# Patient Record
Sex: FEMALE | Race: WHITE | URBAN - METROPOLITAN AREA
[De-identification: names, ages, dates, MRNs, and addresses within clinical notes are randomized per-mention and may not be internally consistent; named-entity substitution may affect disease eponyms.]

---

## 2017-03-06 ENCOUNTER — HOSPITAL ENCOUNTER (OUTPATIENT)
Dept: SURGERY | Age: 62
Discharge: HOME | End: 2017-03-06
Admitting: INTERNAL MEDICINE

## 2017-03-06 VITALS
HEIGHT: 60 IN | TEMPERATURE: 98 F | OXYGEN SATURATION: 94 % | BODY MASS INDEX: 32.2 KG/M2 | HEART RATE: 72 BPM | WEIGHT: 164 LBS | RESPIRATION RATE: 12 BRPM | DIASTOLIC BLOOD PRESSURE: 65 MMHG | SYSTOLIC BLOOD PRESSURE: 112 MMHG

## 2017-03-06 LAB
GLUCOSE BLD-MCNC: 115 MG/DL (ref 70–99)
PERFORMED ON: ABNORMAL

## 2017-03-06 RX ORDER — LIDOCAINE HYDROCHLORIDE 10 MG/ML
0.1 INJECTION, SOLUTION EPIDURAL; INFILTRATION; INTRACAUDAL; PERINEURAL ONCE
Status: DISCONTINUED | OUTPATIENT
Start: 2017-03-06 | End: 2017-03-07 | Stop reason: HOSPADM

## 2017-03-06 RX ORDER — SODIUM CHLORIDE, SODIUM LACTATE, POTASSIUM CHLORIDE, CALCIUM CHLORIDE 600; 310; 30; 20 MG/100ML; MG/100ML; MG/100ML; MG/100ML
1000 INJECTION, SOLUTION INTRAVENOUS ONCE
Status: DISCONTINUED | OUTPATIENT
Start: 2017-03-06 | End: 2017-03-07 | Stop reason: HOSPADM

## 2017-03-06 RX ORDER — LIDOCAINE HYDROCHLORIDE 10 MG/ML
1 INJECTION, SOLUTION EPIDURAL; INFILTRATION; INTRACAUDAL; PERINEURAL ONCE
Status: DISCONTINUED | OUTPATIENT
Start: 2017-03-06 | End: 2017-03-07 | Stop reason: HOSPADM

## 2017-03-06 RX ORDER — SODIUM CHLORIDE, SODIUM LACTATE, POTASSIUM CHLORIDE, CALCIUM CHLORIDE 600; 310; 30; 20 MG/100ML; MG/100ML; MG/100ML; MG/100ML
INJECTION, SOLUTION INTRAVENOUS CONTINUOUS
Status: DISCONTINUED | OUTPATIENT
Start: 2017-03-06 | End: 2017-03-07 | Stop reason: HOSPADM

## 2017-03-06 RX ORDER — ATORVASTATIN CALCIUM 80 MG/1
80 TABLET, FILM COATED ORAL DAILY
COMMUNITY

## 2017-03-06 RX ADMIN — SODIUM CHLORIDE, SODIUM LACTATE, POTASSIUM CHLORIDE, CALCIUM CHLORIDE: 600; 310; 30; 20 INJECTION, SOLUTION INTRAVENOUS at 09:41

## 2017-03-06 ASSESSMENT — PAIN SCALES - GENERAL
PAINLEVEL_OUTOF10: 0

## 2017-03-06 ASSESSMENT — PAIN - FUNCTIONAL ASSESSMENT: PAIN_FUNCTIONAL_ASSESSMENT: 0-10

## 2023-01-09 PROBLEM — M79.7 FIBROMYALGIA: Status: ACTIVE | Noted: 2023-01-09

## 2023-01-09 PROBLEM — K21.9 GASTROESOPHAGEAL REFLUX DISEASE WITHOUT ESOPHAGITIS: Status: ACTIVE | Noted: 2023-01-09

## 2023-01-09 PROBLEM — G56.01 CARPAL TUNNEL SYNDROME OF RIGHT WRIST: Status: ACTIVE | Noted: 2023-01-09

## 2023-01-09 PROBLEM — R73.03 PREDIABETES: Status: ACTIVE | Noted: 2023-01-09

## 2023-01-21 NOTE — PROGRESS NOTES
[address] and [facility]                                             [address] [facility], [address] [place], [place] [address], [address] [place] [address]        Phone: [phone]                                     Name:  Azeem Drummond  :    1955      Consultants:   Patient Care Team:  Ani Constantino PA-C as PCP - General    Chief Complaint:     Azeem Drummond is a 74 y.o. female  who presents today for a New Patient care visit with Personalized Prevention Plan Services as noted below. HPI:     Azeem Drummond is a 72yo female with PMH of TERENCE, HLD, GERD, migraines, fibromyalgia, b/l plantar fasciitis, prediabetes and carpal tunnel (R) who presents today as a new patient to establish care. Her previous PCP was Mau Zarate. Patient is poor historian and has not been to a PCP since 2019. Prediabetes  - previously on Metformin   - has not checked a1c in several years  - she describes a family history of both T1DM and T2DM in her siblings. Obesity  - BMI 34.79  - states she recently started going to the gym and working out and has been trying to eat healthier, noting that she decreased her soda consumption 1/day. Health Maintenance  - Colonoscopy 4 years ago. Will need to get records from Dr. Sumeet Eisenberg. States it was normal at that time. - apparent lumpectomy, sentinel node biopsy 20+ years ago, and has not had mammogram since that time. Declines vaccines today because she wants to check her vaccine record at home. 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 Breast cancer screening  - apparent lumpectomy, sentinel node biopsy 20+ years ago.  States all came back benign.  - has not had mammogram since that time  Screening for osteoporosis/osteopenia  - s/p hysterectomy sometime around 2017.  - DEXA scan never done            Patient Active Problem List   Diagnosis    TERENCE (generalized anxiety disorder)    Other hyperlipidemia    Plantar fasciitis, bilateral    Migraine with aura and without status migrainosus, not intractable    Prediabetes    Fibromyalgia    Carpal tunnel syndrome of right wrist    Gastroesophageal reflux disease without esophagitis         Past Medical History:    Past Medical History:   Diagnosis Date    Arthritis     Depression     Hyperlipidemia     Thyroid disease        Past Surgical History:  Past Surgical History:   Procedure Laterality Date    BREAST LUMPECTOMY      CHOLECYSTECTOMY      COLONOSCOPY  03/06/2017    polyps    HYSTERECTOMY         Home Meds:  Prior to Visit Medications    Medication Sig Taking? Authorizing Provider   naproxen (NAPROSYN) 500 MG tablet Take 1 tablet by mouth 2 times daily (with meals)  Aiyana Ching, APRN - CNP   atorvastatin (LIPITOR) 80 MG tablet Take 80 mg by mouth daily  Historical Provider, MD   metFORMIN (GLUCOPHAGE) 500 MG tablet Take 500 mg by mouth daily  Historical Provider, MD   Cholecalciferol (VITAMIN D-3 PO) Take 2,000 Units by mouth  Historical Provider, MD   aspirin 81 MG tablet Take 81 mg by mouth daily.  Historical Provider, MD       Allergies:    Patient has no active allergies.    Family History:       Problem Relation Age of Onset    Lung Cancer Mother     Diabetes Father     Other Sister         possible colon cancer, pt unsure       Social History:  Social History       Tobacco History       Smoking Status  Former Quit Date  3/12/2017 Smoking Frequency  0.25 packs/day Smoking Tobacco Type  Cigarettes quit in 3/12/2017      Smokeless Tobacco Use  Unknown              Alcohol History       Alcohol Use Status  No              Drug Use       Drug Use Status  No              Sexual Activity       Sexually Active  Not Asked                       Health Maintenance Completed:  Health Maintenance   Topic Date Due    COVID-19 Vaccine (1) Never done    Lipids  Never done    Depression Screen  Never done    HIV screen  Never done    Hepatitis C screen   Never done    Colorectal Cancer Screen  Never done    Breast cancer screen  Never done    Shingles vaccine (1 of 2) Never done    DEXA (modify frequency per FRAX score)  Never done    A1C test (Diabetic or Prediabetic)  12/16/2017    DTaP/Tdap/Td vaccine (2 - Td or Tdap) 06/22/2021    Pneumococcal 65+ years Vaccine (1 - PCV) Never done    Flu vaccine (1) 08/01/2022    Hepatitis A vaccine  Aged Out    Hib vaccine  Aged Out    Meningococcal (ACWY) vaccine  Aged Out          Immunization History   Administered Date(s) Administered    Influenza Virus Vaccine 11/13/2018    Tdap (Boostrix, Adacel) 06/22/2011         Review of Systems:  Review of Systems   Constitutional:  Negative for chills and fever. Respiratory:  Negative for shortness of breath. Cardiovascular:  Negative for chest pain and palpitations. Gastrointestinal:  Negative for constipation, diarrhea, nausea and vomiting. Neurological:  Negative for dizziness and light-headedness. Psychiatric/Behavioral:  Negative for dysphoric mood, self-injury and suicidal ideas. The patient is not nervous/anxious. Physical Exam:   Vitals:    01/24/23 1240   BP: 126/70   Pulse: 60   Temp: 97.4 °F (36.3 °C)   SpO2: 98%   Weight: 190 lb 3.2 oz (86.3 kg)   Height: 5' 2\" (1.575 m)     Body mass index is 34.79 kg/m². Wt Readings from Last 3 Encounters:   04/26/17 173 lb (78.5 kg)   03/06/17 164 lb (74.4 kg)   09/14/16 177 lb 14.6 oz (80.7 kg)       BP Readings from Last 3 Encounters:   04/26/17 (!) 141/46   03/06/17 112/65   09/14/16 (!) 180/93       Physical Exam  Constitutional:       General: She is not in acute distress. HENT:      Head: Normocephalic and atraumatic. Cardiovascular:      Rate and Rhythm: Normal rate and regular rhythm. Heart sounds: Normal heart sounds. No murmur heard. No friction rub. No gallop. Pulmonary:      Effort: Pulmonary effort is normal. No respiratory distress. Breath sounds: Normal breath sounds.  No wheezing, rhonchi or rales. Abdominal:      General: Bowel sounds are normal. There is no distension. Tenderness: There is no abdominal tenderness. There is no guarding. Musculoskeletal:      Right lower leg: No edema. Left lower leg: No edema. Neurological:      General: No focal deficit present. Mental Status: She is alert. Psychiatric:         Mood and Affect: Mood normal.         Thought Content: Thought content normal.            Lab Review:   No visits with results within 6 Month(s) from this visit. Latest known visit with results is:   Hospital Outpatient Visit on 03/06/2017   Component Date Value    POC Glucose 03/06/2017 115 (A)     Performed on 03/06/2017 ACCU-CHEK           Assessment/Plan:  Guadalupe Eisenmenger is a 74yo female with PMH of TERENCE, HLD, GERD, migraines, fibromyalgia, b/l plantar fasciitis, prediabetes and carpal tunnel (R) who presents today as a new patient to establish care. Diagnoses and all orders for this visit:    Encounter to establish care with new doctor  -     CBC with Auto Differential; Future  -     Comprehensive Metabolic Panel; Future  -     LIPID PANEL; Future  -     Hemoglobin A1C; Future  - follow up in 4 weeks    Obesity (BMI 30-39.9)  - not at goal  - Discussed at length Lifestyle modifications to include decreased consumption of sugary foods and refined carbohydrates (pastas, breads, etc), increased consumption of lean proteins, increased consumption of green leafy vegetables, and at least 150min of moderate intensity exercise each week. -     CBC with Auto Differential; Future  -     Comprehensive Metabolic Panel; Future  -     LIPID PANEL; Future  -     Hemoglobin A1C; Future  - follow up in 4 weeks    Breast cancer screening by mammogram  - unknown status  - apparent lumpectomy and sentinel node biopsy greater than 20 years ago per patient.  States all came back benign   - Overdue for screening per USPSTF guidelines for biennial screening in women 55-74 years  -     JARAD DIGITAL SCREEN W OR WO CAD BILATERAL; Future  - follow up in 4 weeks      Other specified diabetes mellitus without complications ([facility] [address]   History of prediabetes  - unknown status   - Hemoglobin A1C; Future  - previously on Metformin  - last a1c unknown  - follow up in 4 weeks    Age-related osteoporosis without current pathological fracture   Encounter for screening for osteoporosis  Post-menopause  - status unknown  - Overdue for screening per USPSTF guidelines for women 65+  - DEXA BONE DENSITY AXIAL SKELETON; Future  - DEXA never done. - follow up in 4 weeks    Healthcare maintenance  -     LIPID PANEL; Future  -     Hemoglobin A1C; Future  -     JARAD DIGITAL SCREEN W OR WO CAD BILATERAL; Future  -     DEXA BONE DENSITY AXIAL SKELETON; Future  - declines HIV and HepC screening at this time and states she \"has not been with someone in over 16 years\" and \"if I had it, I would know. 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 - follow up in 4 weeks      Health Maintenance Due:  Health Maintenance Due   Topic Date Due    COVID-19 Vaccine (1) Never done    Lipids  Never done    Depression Screen  Never done    HIV screen  Never done    Hepatitis C screen  Never done    Colorectal Cancer Screen  Never done    Breast cancer screen  Never done    Shingles vaccine (1 of 2) Never done    DEXA (modify frequency per FRAX score)  Never done    A1C test (Diabetic or Prediabetic)  12/16/2017    DTaP/Tdap/Td vaccine (2 - Td or Tdap) 06/22/2021    Pneumococcal 65+ years Vaccine (1 - PCV) Never done    Flu vaccine (1) 08/01/2022        Health care decision maker:   Discussed today and patient named and filled out a form designating her daughter.       Health Maintenance: (USPSTF Recommendations)  (F) Breast Cancer Screen: (40-49 (C), 50-74 biennial screening mammogram (B))  (F) Cervical Cancer Screen: (21-29 q3yr cytology alone; 30-65 q3yr cytology alone, q5yr with hrHPV alone, or q5yr cytology+hrHPV (A))  (M) Prostate Cancer Screen: (54-79 yo discuss benefits/harm, does not recommend testing PSA in men >73 yo (D):   (M) AAA Screen: (men 73-69 yo who has ever smoked (B), consider in nonsmokers if high risk):  CRC/Colonoscopy Screening: (adults 39-53 (B), 50-75 (A))  Lung Ca Screening: Annual LDCT (+smoker age 49-80, smoked within 15 years, total of 20 pack yr history (B)):  DEXA Screen: (women >65 and older, <65 if at risk/postmenopausal (B))  HIV Screen: (16-65 yr old, and all pregnant patients (A)): Hep C Screen: (18-79 yr old (B)):  HCC Screen: (all pts with cirrhosis and high risk Hep B (US q6 mo)):  Immunizations:    RTC:  No follow-ups on file. EMR Dragon/transcription disclaimer:  Much of this encounter note is electronic transcription/translation of spoken language to printed texts. The electronic translation of spoken language may be erroneous, or at times, nonsensical words or phrases may be inadvertently transcribed.   Although I have reviewed the note for such errors, some may still exist.

## 2023-01-24 ENCOUNTER — OFFICE VISIT (OUTPATIENT)
Dept: PRIMARY CARE CLINIC | Age: 68
End: 2023-01-24
Payer: COMMERCIAL

## 2023-01-24 VITALS
SYSTOLIC BLOOD PRESSURE: 126 MMHG | BODY MASS INDEX: 35 KG/M2 | HEART RATE: 60 BPM | TEMPERATURE: 97.4 F | DIASTOLIC BLOOD PRESSURE: 70 MMHG | OXYGEN SATURATION: 98 % | WEIGHT: 190.2 LBS | HEIGHT: 62 IN

## 2023-01-24 DIAGNOSIS — E13.9 OTHER SPECIFIED DIABETES MELLITUS WITHOUT COMPLICATIONS (HCC): ICD-10-CM

## 2023-01-24 DIAGNOSIS — Z12.31 BREAST CANCER SCREENING BY MAMMOGRAM: ICD-10-CM

## 2023-01-24 DIAGNOSIS — E66.9 OBESITY (BMI 30-39.9): ICD-10-CM

## 2023-01-24 DIAGNOSIS — Z87.898 HISTORY OF PREDIABETES: ICD-10-CM

## 2023-01-24 DIAGNOSIS — Z78.0 POST-MENOPAUSE: ICD-10-CM

## 2023-01-24 DIAGNOSIS — M81.0 AGE-RELATED OSTEOPOROSIS WITHOUT CURRENT PATHOLOGICAL FRACTURE: ICD-10-CM

## 2023-01-24 DIAGNOSIS — Z13.820 ENCOUNTER FOR SCREENING FOR OSTEOPOROSIS: ICD-10-CM

## 2023-01-24 DIAGNOSIS — Z76.89 ENCOUNTER TO ESTABLISH CARE WITH NEW DOCTOR: Primary | ICD-10-CM

## 2023-01-24 DIAGNOSIS — Z00.00 HEALTHCARE MAINTENANCE: ICD-10-CM

## 2023-01-24 PROCEDURE — 99204 OFFICE O/P NEW MOD 45 MIN: CPT

## 2023-01-24 PROCEDURE — 1123F ACP DISCUSS/DSCN MKR DOCD: CPT

## 2023-01-24 RX ORDER — ACETAMINOPHEN 500 MG
500 TABLET ORAL EVERY 6 HOURS PRN
COMMUNITY

## 2023-01-24 RX ORDER — COVID-19 ANTIGEN TEST
KIT MISCELLANEOUS
COMMUNITY

## 2023-01-24 RX ORDER — CHLORAL HYDRATE 500 MG
CAPSULE ORAL DAILY
COMMUNITY

## 2023-01-24 SDOH — ECONOMIC STABILITY: FOOD INSECURITY: WITHIN THE PAST 12 MONTHS, YOU WORRIED THAT YOUR FOOD WOULD RUN OUT BEFORE YOU GOT MONEY TO BUY MORE.: NEVER TRUE

## 2023-01-24 SDOH — ECONOMIC STABILITY: FOOD INSECURITY: WITHIN THE PAST 12 MONTHS, THE FOOD YOU BOUGHT JUST DIDN'T LAST AND YOU DIDN'T HAVE MONEY TO GET MORE.: NEVER TRUE

## 2023-01-24 ASSESSMENT — ENCOUNTER SYMPTOMS
COUGH: 0
ABDOMINAL PAIN: 0
DIARRHEA: 0
VOMITING: 0
BLOOD IN STOOL: 0
SHORTNESS OF BREATH: 0
CONSTIPATION: 0
TROUBLE SWALLOWING: 0
NAUSEA: 0

## 2023-01-24 ASSESSMENT — SOCIAL DETERMINANTS OF HEALTH (SDOH): HOW HARD IS IT FOR YOU TO PAY FOR THE VERY BASICS LIKE FOOD, HOUSING, MEDICAL CARE, AND HEATING?: NOT HARD AT ALL

## 2023-01-24 NOTE — PROGRESS NOTES
[address],  [place], [address] [facility] [address]        Phone: [phone]    The following is written by a medical student, please see below for resident/attending attestation and plan. Name:  Anton Goodrich  :    1955    Consultants:   Patient Care Team:  Haris Mock DO as PCP - General (Family Medicine)    Chief Complaint:     Anton Goodrich is a 74 y.o. female with history of self reported prediabetes and obesity who presents today for an established patient care visit with Personalized Prevention Plan Services as noted below. HPI:       She is here to establish care because she has not seen a provider in many years due to being uninsured. She stopped taking all prescriptions from her former doctor. She cannot recall all the medications besides metformin. Obesity  She reports trying to make healthier choices. She is down to 1 soda per day. She goes to the gym a few times per week and walks frequently at work. Health maintenance:  Last pap smear was unknown date and was normal.  Last mammogram was >2 years ago and was normal. History of lumpectomies bilaterally, she says they were benign. DEXA never done. Last colonoscopy was 3-4 years ago and they removed some polyps. Declines vaccines today because she wants to check her vaccine record at home. 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       Patient Active Problem List   Diagnosis    TERENCE (generalized anxiety disorder)    Other hyperlipidemia    Plantar fasciitis, bilateral    Migraine with aura and without status migrainosus, not intractable    Prediabetes    Fibromyalgia    Carpal tunnel syndrome of right wrist    Gastroesophageal reflux disease without esophagitis       Past Medical History:    Past Medical History:   Diagnosis Date    Arthritis     Depression     Hyperlipidemia     Thyroid disease        Past Surgical History:  Past Surgical History:   Procedure Laterality Date    BREAST LUMPECTOMY      CHOLECYSTECTOMY      COLONOSCOPY  03/06/2017    polyps    HYSTERECTOMY (CERVIX STATUS UNKNOWN)         Home Meds:  Prior to Visit Medications    Medication Sig Taking? Authorizing Provider   Omega-3 Fatty Acids (FISH OIL) 1000 MG capsule Take by mouth daily Yes Historical Provider, MD   Multiple Vitamins-Minerals (ONE-A-DAY WOMENS VITACRAVES PO) Take by mouth Yes Historical Provider, MD   acetaminophen (TYLENOL) 500 MG tablet Take 500 mg by mouth every 6 hours as needed for Pain Yes Historical Provider, MD   Naproxen Sodium (ALEVE) 220 MG CAPS Take by mouth Yes Historical Provider, MD   aspirin 81 MG tablet Take 81 mg by mouth daily. Yes Historical Provider, MD       Allergies:    Patient has no known allergies.    Family History:       Problem Relation Age of Onset    Lung Cancer Mother     Diabetes Father     Other Sister         possible colon cancer, pt unsure       Social History  Tobacco use--Former smoker, quit 21 years ago. Started age 23. Off and on for a few months, smoking <1 pack per week.  Alcohol use--0 drinks per week.  Drug use--Never.  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    Health Maintenance Completed:  Health Maintenance   Topic Date Due    Depression Screen  Never done    HIV screen  Never done    Hepatitis C screen  Never done    Lipids  Never done    Colorectal Cancer Screen  Never done    Breast cancer screen  Never done    Shingles vaccine (1 of 2) Never done    DEXA (modify frequency per FRAX score)  Never done    A1C test (Diabetic or Prediabetic)  11/16/2019    DTaP/Tdap/Td vaccine (2 - Td or Tdap) 06/22/2021    COVID-19 Vaccine (4 - Booster for Pfizer series) 12/21/2021    Pneumococcal 65+ years Vaccine (1 - PCV) Never done    Flu vaccine (1) 08/01/2022    Hepatitis A vaccine  Aged Out    Hib vaccine   Aged Out    Meningococcal (ACWY) vaccine  Aged Out          Immunization History   Administered Date(s) Administered    COVID-19, PFIZER PURPLE top, DILUTE for use, (age 15 y+), 30mcg/0.3mL 12/21/2020, 01/11/2021, 10/26/2021    Influenza Virus Vaccine 11/13/2018    Tdap (Boostrix, Adacel) 06/22/2011         Review of Systems:  Review of Systems   Constitutional:  Negative for chills and fever. HENT:  Negative for hearing loss and trouble swallowing. Respiratory:  Negative for cough and shortness of breath. Cardiovascular:  Negative for chest pain, palpitations and leg swelling. Gastrointestinal:  Negative for abdominal pain, blood in stool and diarrhea. Genitourinary:  Negative for dysuria and hematuria. Musculoskeletal:  Negative for arthralgias and myalgias. Skin: Negative. Neurological:  Negative for weakness and light-headedness. Hematological: Negative. Physical Exam:   Vitals:    01/24/23 1240   BP: 126/70   Pulse: 60   Temp: 97.4 °F (36.3 °C)   SpO2: 98%   Weight: 190 lb 3.2 oz (86.3 kg)   Height: 5' 2\" (1.575 m)     Body mass index is 34.79 kg/m². Wt Readings from Last 3 Encounters:   01/24/23 190 lb 3.2 oz (86.3 kg)   04/26/17 173 lb (78.5 kg)   03/06/17 164 lb (74.4 kg)       BP Readings from Last 3 Encounters:   01/24/23 126/70   04/26/17 (!) 141/46   03/06/17 112/65       Physical Exam  Constitutional:       General: She is not in acute distress. Appearance: Normal appearance. HENT:      Right Ear: Tympanic membrane and ear canal normal.      Left Ear: Tympanic membrane and ear canal normal.      Mouth/Throat:      Mouth: Mucous membranes are moist.      Pharynx: Oropharynx is clear. No oropharyngeal exudate. Eyes:      Conjunctiva/sclera: Conjunctivae normal.      Pupils: Pupils are equal, round, and reactive to light. Cardiovascular:      Rate and Rhythm: Normal rate and regular rhythm. Pulses: Normal pulses. Heart sounds: No murmur heard. No gallop. Pulmonary:      Effort: Pulmonary effort is normal.      Breath sounds: Normal breath sounds. Abdominal:      General: Abdomen is flat. There is no distension. Palpations: Abdomen is soft. There is no mass. Tenderness: There is no abdominal tenderness. Musculoskeletal:         General: No swelling or deformity. Right lower leg: No edema. Skin:     General: Skin is warm and dry. Neurological:      General: No focal deficit present. Mental Status: She is alert and oriented to person, place, and time. Lab Review: not applicable       Assessment/Plan:  Diagnoses and all orders for this visit:    Encounter to establish care with new doctor  -     CBC with Auto Differential; Future  -     Comprehensive Metabolic Panel; Future  -     LIPID PANEL; Future  -     Hemoglobin A1C; Future    Obesity (BMI 30-39.9)  - Uncontrolled  - Encouraged patient's goals to make positive changes with reducing soda intake and adding exercise.  - Recommend 150 mins of exercise per week and a low calorie diet. -     CBC with Auto Differential; Future  -     Comprehensive Metabolic Panel; Future  -     LIPID PANEL; Future  -     Hemoglobin A1C; Future    Breast cancer screening by mammogram  - Overdue for screening per USPSTF guidelines for biennial screening in women 55-74 years  - Repeat screening mammogram  -     JARAD DIGITAL SCREEN W OR WO CAD BILATERAL; Future    Encounter for screening for osteoporosis  - Overdue for screening per USPSTF guidelines for women 65+  - Obtain DEXA  -     DEXA BONE DENSITY AXIAL SKELETON; Future    Healthcare maintenance  -     CBC with Auto Differential; Future  -     Comprehensive Metabolic Panel; Future  -     LIPID PANEL; Future  -     Hemoglobin A1C; Future  -     JARAD DIGITAL SCREEN W OR WO CAD BILATERAL;  Future  -     DEXA BONE DENSITY AXIAL SKELETON; Future    History of prediabetes  - Uncontrolled on lifestyle management  - Check A1c today  -     Hemoglobin A1C; Future    Post-menopause  -     JARAD DIGITAL SCREEN W OR WO CAD BILATERAL; Future    Other specified diabetes mellitus without complications ([facility] [address]   -     Hemoglobin A1C; Future    Age-related osteoporosis without current pathological fracture   -     DEXA BONE DENSITY AXIAL SKELETON; Future    Patient declined vaccinations today. Health Maintenance Due:  Health Maintenance Due   Topic Date Due    Depression Screen  Never done    HIV screen  Never done    Hepatitis C screen  Never done    Lipids  Never done    Colorectal Cancer Screen  Never done    Breast cancer screen  Never done    Shingles vaccine (1 of 2) Never done    DEXA (modify frequency per FRAX score)  Never done    A1C test (Diabetic or Prediabetic)  11/16/2019    DTaP/Tdap/Td vaccine (2 - Td or Tdap) 06/22/2021    COVID-19 Vaccine (4 - Booster for Pfizer series) 12/21/2021    Pneumococcal 65+ years Vaccine (1 - PCV) Never done    Flu vaccine (1) 08/01/2022        hospitals QR code:      Health Maintenance: (USPSTF Recommendations)  (F) Breast Cancer Screen: (40-49 (C), 50-74 biennial screening mammogram (B))  (F) Cervical Cancer Screen: (21-29 q3yr cytology alone; 30-65 q3yr cytology alone, q5yr with hrHPV alone, or q5yr cytology+hrHPV (A))  (M) Prostate Cancer Screen: (54-77 yo discuss benefits/harm, does not recommend testing PSA in men >73 yo (D):   (M) AAA Screen: (men 73-67 yo who has ever smoked (B), consider in nonsmokers if high risk):  CRC/Colonoscopy Screening: (adults 39-53 (B), 50-75 (A))  Lung Ca Screening: Annual LDCT (+smoker age 49-80, smoked within 15 years, total of 20 pack yr history (B)):  DEXA Screen: (women >65 and older, <65 if at risk/postmenopausal (B))  HIV Screen: (16-65 yr old, and all pregnant patients (A)): Hep C Screen: (18-79 yr old (B)):  HCC Screen: (all pts with cirrhosis and high risk Hep B (US q6 mo)):  Immunizations:    RTC:  Return in about 4 weeks (around 2/21/2023) for lab results. .       RESIDENT/ATTENDING ATTESTATION:    After medical student evaluation and exam, I independently gathered patients history, independently did a physical, and agree with A/P as written in medical student's note (other than clarified below). Please see below for additional information documented by the resident/attending including the A/P. Assessment/Plan:      EMR Dragon/transcription disclaimer:  Much of this encounter note is electronic transcription/translation of spoken language to printed texts. The electronic translation of spoken language may be erroneous, or at times, nonsensical words or phrases may be inadvertently transcribed.   Although I have reviewed the note for such errors, some may still exist.

## 2023-02-20 NOTE — PROGRESS NOTES
[address] and [facility]idency [facility]                                             [address] [facility], [address] [place] [address]        Phone: [phone]      Name:  Debbie Gusman  :    1955    Consultants:   Patient Care Team:  Que Varma DO as PCP - General (Family Medicine)    Chief Complaint:     Debbie Gusman is a 74 y.o. female  who presents today for an established patient care visit with Personalized Prevention Plan Services as noted below. HPI:     Debbie Gusman is a 72yo female with PMH of TERENCE, HLD, GERD, migraines, fibromyalgia, b/l plantar fasciitis, prediabetes and carpal tunnel (R) who presents today as an established patient to follow up on lab results, though patient had not completed labs at time of visit. Depression  - patient states she had previously suffered from depression around the time her   a a couple of years ago. At that time she was under a lot of stress while also grieving the loss of her .   - she no longer takes medication and states she is doing well.   - PHQ-9 Total Score: 0 (2023  4:17 PM)  Thoughts that you would be better off dead, or of hurting yourself in some way: 0 (2023  4:17 PM)  - TERENCE-7 total score: 1 (2023)    Prediabetes  - previously took metformin  - had not yet had updated labs at time of visit     Obesity  - has lost 3 lbs since last document weight in 2023.  - lifestyle modificaitons      Patient Active Problem List   Diagnosis    TERENCE (generalized anxiety disorder)    Other hyperlipidemia    Plantar fasciitis, bilateral    Migraine with aura and without status migrainosus, not intractable    Prediabetes    Fibromyalgia    Carpal tunnel syndrome of right wrist    Gastroesophageal reflux disease without esophagitis         Past Medical History:    Past Medical History:   Diagnosis Date    Arthritis Depression     Hyperlipidemia     Thyroid disease        Past Surgical History:  Past Surgical History:   Procedure Laterality Date    BREAST LUMPECTOMY      CHOLECYSTECTOMY      COLONOSCOPY  03/06/2017    polyps    HYSTERECTOMY (CERVIX STATUS UNKNOWN)         Home Meds:  Prior to Visit Medications    Medication Sig Taking? Authorizing Provider   Omega-3 Fatty Acids (FISH OIL) 1000 MG capsule Take by mouth daily  Historical Provider, MD   Multiple Vitamins-Minerals (ONE-A-DAY WOMENS VITACRAVES PO) Take by mouth  Historical Provider, MD   acetaminophen (TYLENOL) 500 MG tablet Take 500 mg by mouth every 6 hours as needed for Pain  Historical Provider, MD   Naproxen Sodium (ALEVE) 220 MG CAPS Take by mouth  Historical Provider, MD   aspirin 81 MG tablet Take 81 mg by mouth daily. Historical Provider, MD       Allergies:    Patient has no known allergies.     Family History:       Problem Relation Age of Onset    Lung Cancer Mother     Diabetes Father     Other Sister         possible colon cancer, pt unsure         Health Maintenance Completed:  Health Maintenance   Topic Date Due    Pneumococcal 65+ years Vaccine (1 - PCV) Never done    HIV screen  Never done    Hepatitis C screen  Never done    Lipids  Never done    Colorectal Cancer Screen  Never done    Breast cancer screen  Never done    Shingles vaccine (1 of 2) Never done    DEXA (modify frequency per FRAX score)  Never done    A1C test (Diabetic or Prediabetic)  11/16/2019    DTaP/Tdap/Td vaccine (2 - Td or Tdap) 06/22/2021    COVID-19 Vaccine (4 - Booster for Pfizer series) 12/21/2021    Flu vaccine (1) 08/01/2022    Depression Screen  01/24/2024    Hepatitis A vaccine  Aged Out    Hib vaccine  Aged Out    Meningococcal (ACWY) vaccine  Aged Out          Immunization History   Administered Date(s) Administered    COVID-19, PFIZER PURPLE top, DILUTE for use, (age 15 y+), 30mcg/0.3mL 12/21/2020, 01/11/2021, 10/26/2021    Influenza Virus Vaccine 11/13/2018    Tdap (Boostrix, Adacel) 06/22/2011         Review of Systems:  Review of Systems   Constitutional:  Negative for chills and fever. Respiratory:  Negative for shortness of breath. Cardiovascular:  Negative for chest pain and palpitations. Psychiatric/Behavioral:  Negative for dysphoric mood, self-injury, sleep disturbance and suicidal ideas. The patient is not nervous/anxious. Physical Exam:   There were no vitals filed for this visit. There is no height or weight on file to calculate BMI. Wt Readings from Last 3 Encounters:   01/24/23 190 lb 3.2 oz (86.3 kg)   04/26/17 173 lb (78.5 kg)   03/06/17 164 lb (74.4 kg)       BP Readings from Last 3 Encounters:   01/24/23 126/70   04/26/17 (!) 141/46   03/06/17 112/65       Physical Exam  Constitutional:       General: She is not in acute distress. HENT:      Head: Normocephalic and atraumatic. Cardiovascular:      Rate and Rhythm: Normal rate and regular rhythm. Heart sounds: No murmur heard. No friction rub. No gallop. Pulmonary:      Effort: Pulmonary effort is normal. No respiratory distress. Breath sounds: Normal breath sounds. No stridor. No wheezing, rhonchi or rales. Neurological:      General: No focal deficit present. Mental Status: She is alert. Psychiatric:         Mood and Affect: Mood normal.            Lab Review: not applicable       Assessment/Plan:  Candance Blare is a 72yo female with PMH of TERENCE, HLD, GERD, migraines, fibromyalgia, b/l plantar fasciitis, prediabetes and carpal tunnel (R) who presents today as an established patient to follow up on lab results, though patient had not completed labs at time of visit.      Diagnoses and all orders for this visit:    Prediabetes  - status unknown  -     Hemoglobin A1C  - follow up in 2 weeks    Obesity (BMI 30-39.9)  - not well controlled  -     LIPID PANEL  -     Comprehensive Metabolic Panel  -     CBC with Auto Differential  - Lifestyle modifications to include decreased consumption of sugary foods and refined carbohydrates (pastas, breads, etc), increased consumption of lean proteins, increased consumption of green leafy vegetables, and at least 150min of moderate intensity exercise each week  - follow up in 2 weeks. Depression  - resolved  - not currently requiting medical management  - appears to be doing well and is up-beat and pleasant  - will continue to monitor    Will return in 2 weeks with lab results. Labs drawn in clinic today. Health Maintenance Due:  Health Maintenance Due   Topic Date Due    Pneumococcal 65+ years Vaccine (1 - PCV) Never done    HIV screen  Never done    Hepatitis C screen  Never done    Lipids  Never done    Colorectal Cancer Screen  Never done    Breast cancer screen  Never done    Shingles vaccine (1 of 2) Never done    DEXA (modify frequency per FRAX score)  Never done    A1C test (Diabetic or Prediabetic)  11/16/2019    DTaP/Tdap/Td vaccine (2 - Td or Tdap) 06/22/2021    COVID-19 Vaccine (4 - Booster for Pfizer series) 12/21/2021    Flu vaccine (1) 08/01/2022          Health care decision maker:  up to date:  already done      Health Maintenance: (USPSTF Recommendations)  (F) Breast Cancer Screen: (40-49 (C), 50-74 biennial screening mammogram (B))  (F) Cervical Cancer Screen: (21-29 q3yr cytology alone; 30-65 q3yr cytology alone, q5yr with hrHPV alone, or q5yr cytology+hrHPV (A))  (M) Prostate Cancer Screen: (54-77 yo discuss benefits/harm, does not recommend testing PSA in men >73 yo (D):   (M) AAA Screen: (men 73-69 yo who has ever smoked (B), consider in nonsmokers if high risk):  CRC/Colonoscopy Screening: (adults 39-53 (B), 50-75 (A))  Lung Ca Screening: Annual LDCT (+smoker age 49-80, smoked within 15 years, total of 20 pack yr history (B)):  DEXA Screen: (women >65 and older, <65 if at risk/postmenopausal (B))  HIV Screen: (16-65 yr old, and all pregnant patients (A)):   Hep C Screen: (18-79 yr old (B)):  HCC Screen: (all pts with cirrhosis and high risk Hep B (US q6 mo)):  Immunizations:    RTC:  No follow-ups on file. EMR Dragon/transcription disclaimer:  Much of this encounter note is electronic transcription/translation of spoken language to printed texts. The electronic translation of spoken language may be erroneous, or at times, nonsensical words or phrases may be inadvertently transcribed.   Although I have reviewed the note for such errors, some may still exist.

## 2023-02-21 ENCOUNTER — OFFICE VISIT (OUTPATIENT)
Dept: PRIMARY CARE CLINIC | Age: 68
End: 2023-02-21

## 2023-02-21 VITALS
BODY MASS INDEX: 34.02 KG/M2 | SYSTOLIC BLOOD PRESSURE: 136 MMHG | HEART RATE: 54 BPM | DIASTOLIC BLOOD PRESSURE: 74 MMHG | TEMPERATURE: 97.4 F | OXYGEN SATURATION: 98 % | WEIGHT: 186 LBS

## 2023-02-21 DIAGNOSIS — Z12.11 SCREENING FOR COLON CANCER: ICD-10-CM

## 2023-02-21 DIAGNOSIS — E66.9 OBESITY (BMI 30-39.9): ICD-10-CM

## 2023-02-21 DIAGNOSIS — Z86.010 HISTORY OF COLON POLYPS: ICD-10-CM

## 2023-02-21 DIAGNOSIS — F32.5 MAJOR DEPRESSIVE DISORDER IN FULL REMISSION, UNSPECIFIED WHETHER RECURRENT (HCC): ICD-10-CM

## 2023-02-21 DIAGNOSIS — R73.03 PREDIABETES: Primary | ICD-10-CM

## 2023-02-21 SDOH — ECONOMIC STABILITY: INCOME INSECURITY: HOW HARD IS IT FOR YOU TO PAY FOR THE VERY BASICS LIKE FOOD, HOUSING, MEDICAL CARE, AND HEATING?: NOT HARD AT ALL

## 2023-02-21 SDOH — ECONOMIC STABILITY: FOOD INSECURITY: WITHIN THE PAST 12 MONTHS, THE FOOD YOU BOUGHT JUST DIDN'T LAST AND YOU DIDN'T HAVE MONEY TO GET MORE.: NEVER TRUE

## 2023-02-21 SDOH — ECONOMIC STABILITY: HOUSING INSECURITY
IN THE LAST 12 MONTHS, WAS THERE A TIME WHEN YOU DID NOT HAVE A STEADY PLACE TO SLEEP OR SLEPT IN A SHELTER (INCLUDING NOW)?: NO

## 2023-02-21 SDOH — ECONOMIC STABILITY: FOOD INSECURITY: WITHIN THE PAST 12 MONTHS, YOU WORRIED THAT YOUR FOOD WOULD RUN OUT BEFORE YOU GOT MONEY TO BUY MORE.: NEVER TRUE

## 2023-02-21 ASSESSMENT — PATIENT HEALTH QUESTIONNAIRE - PHQ9
8. MOVING OR SPEAKING SO SLOWLY THAT OTHER PEOPLE COULD HAVE NOTICED. OR THE OPPOSITE, BEING SO FIGETY OR RESTLESS THAT YOU HAVE BEEN MOVING AROUND A LOT MORE THAN USUAL: 0
9. THOUGHTS THAT YOU WOULD BE BETTER OFF DEAD, OR OF HURTING YOURSELF: 0
1. LITTLE INTEREST OR PLEASURE IN DOING THINGS: 0
7. TROUBLE CONCENTRATING ON THINGS, SUCH AS READING THE NEWSPAPER OR WATCHING TELEVISION: 0
6. FEELING BAD ABOUT YOURSELF - OR THAT YOU ARE A FAILURE OR HAVE LET YOURSELF OR YOUR FAMILY DOWN: 0
SUM OF ALL RESPONSES TO PHQ QUESTIONS 1-9: 0
5. POOR APPETITE OR OVEREATING: 0
10. IF YOU CHECKED OFF ANY PROBLEMS, HOW DIFFICULT HAVE THESE PROBLEMS MADE IT FOR YOU TO DO YOUR WORK, TAKE CARE OF THINGS AT HOME, OR GET ALONG WITH OTHER PEOPLE: 0
3. TROUBLE FALLING OR STAYING ASLEEP: 0
4. FEELING TIRED OR HAVING LITTLE ENERGY: 0
2. FEELING DOWN, DEPRESSED OR HOPELESS: 0
SUM OF ALL RESPONSES TO PHQ QUESTIONS 1-9: 0
SUM OF ALL RESPONSES TO PHQ9 QUESTIONS 1 & 2: 0
SUM OF ALL RESPONSES TO PHQ QUESTIONS 1-9: 0
SUM OF ALL RESPONSES TO PHQ QUESTIONS 1-9: 0

## 2023-02-21 ASSESSMENT — ANXIETY QUESTIONNAIRES
4. TROUBLE RELAXING: 1-SEVERAL DAYS
7. FEELING AFRAID AS IF SOMETHING AWFUL MIGHT HAPPEN: 0-NOT AT ALL
GAD7 TOTAL SCORE: 1
1. FEELING NERVOUS, ANXIOUS, OR ON EDGE: 0
5. BEING SO RESTLESS THAT IT IS HARD TO SIT STILL: 0-NOT AT ALL
2. NOT BEING ABLE TO STOP OR CONTROL WORRYING: 0-NOT AT ALL
6. BECOMING EASILY ANNOYED OR IRRITABLE: 0-NOT AT ALL
3. WORRYING TOO MUCH ABOUT DIFFERENT THINGS: 0-NOT AT ALL

## 2023-02-21 ASSESSMENT — SOCIAL DETERMINANTS OF HEALTH (SDOH): HOW HARD IS IT FOR YOU TO PAY FOR THE VERY BASICS LIKE FOOD, HOUSING, MEDICAL CARE, AND HEATING?: NOT HARD AT ALL

## 2023-02-22 LAB
A/G RATIO: 1.6 (ref 1.1–2.2)
ALBUMIN SERPL-MCNC: 4.3 G/DL (ref 3.4–5)
ALP BLD-CCNC: 63 U/L (ref 40–129)
ALT SERPL-CCNC: 27 U/L (ref 10–40)
ANION GAP SERPL CALCULATED.3IONS-SCNC: 18 MMOL/L (ref 3–16)
AST SERPL-CCNC: 23 U/L (ref 15–37)
BASOPHILS ABSOLUTE: 0.1 K/UL (ref 0–0.2)
BASOPHILS RELATIVE PERCENT: 0.8 %
BILIRUB SERPL-MCNC: 0.5 MG/DL (ref 0–1)
BUN BLDV-MCNC: 8 MG/DL (ref 7–20)
CALCIUM SERPL-MCNC: 10 MG/DL (ref 8.3–10.6)
CHLORIDE BLD-SCNC: 105 MMOL/L (ref 99–110)
CHOLESTEROL, TOTAL: 292 MG/DL (ref 0–199)
CO2: 23 MMOL/L (ref 21–32)
CREAT SERPL-MCNC: 0.8 MG/DL (ref 0.6–1.2)
EOSINOPHILS ABSOLUTE: 0.2 K/UL (ref 0–0.6)
EOSINOPHILS RELATIVE PERCENT: 1.9 %
ESTIMATED AVERAGE GLUCOSE: 122.6 MG/DL
GFR SERPL CREATININE-BSD FRML MDRD: >60 ML/MIN/{1.73_M2}
GLUCOSE BLD-MCNC: 87 MG/DL (ref 70–99)
HBA1C MFR BLD: 5.9 %
HCT VFR BLD CALC: 43.3 % (ref 36–48)
HDLC SERPL-MCNC: 56 MG/DL (ref 40–60)
HEMOGLOBIN: 14.6 G/DL (ref 12–16)
LDL CHOLESTEROL CALCULATED: 178 MG/DL
LYMPHOCYTES ABSOLUTE: 3.5 K/UL (ref 1–5.1)
LYMPHOCYTES RELATIVE PERCENT: 35 %
MCH RBC QN AUTO: 30.2 PG (ref 26–34)
MCHC RBC AUTO-ENTMCNC: 33.7 G/DL (ref 31–36)
MCV RBC AUTO: 89.7 FL (ref 80–100)
MONOCYTES ABSOLUTE: 0.8 K/UL (ref 0–1.3)
MONOCYTES RELATIVE PERCENT: 8 %
NEUTROPHILS ABSOLUTE: 5.4 K/UL (ref 1.7–7.7)
NEUTROPHILS RELATIVE PERCENT: 54.3 %
PDW BLD-RTO: 13.3 % (ref 12.4–15.4)
PLATELET # BLD: 206 K/UL (ref 135–450)
PMV BLD AUTO: 10.7 FL (ref 5–10.5)
POTASSIUM SERPL-SCNC: 3.8 MMOL/L (ref 3.5–5.1)
RBC # BLD: 4.83 M/UL (ref 4–5.2)
SODIUM BLD-SCNC: 146 MMOL/L (ref 136–145)
TOTAL PROTEIN: 7 G/DL (ref 6.4–8.2)
TRIGL SERPL-MCNC: 288 MG/DL (ref 0–150)
VLDLC SERPL CALC-MCNC: 58 MG/DL
WBC # BLD: 9.9 K/UL (ref 4–11)

## 2023-02-22 ASSESSMENT — ENCOUNTER SYMPTOMS: SHORTNESS OF BREATH: 0

## 2023-03-07 PROBLEM — E66.09 CLASS 1 OBESITY DUE TO EXCESS CALORIES WITHOUT SERIOUS COMORBIDITY WITH BODY MASS INDEX (BMI) OF 34.0 TO 34.9 IN ADULT: Status: ACTIVE | Noted: 2023-03-07

## 2023-03-07 PROBLEM — F32.5 MAJOR DEPRESSIVE DISORDER WITH SINGLE EPISODE, IN FULL REMISSION (HCC): Status: ACTIVE | Noted: 2023-03-07

## 2023-03-07 PROBLEM — E66.811 CLASS 1 OBESITY DUE TO EXCESS CALORIES WITHOUT SERIOUS COMORBIDITY WITH BODY MASS INDEX (BMI) OF 34.0 TO 34.9 IN ADULT: Status: ACTIVE | Noted: 2023-03-07

## 2023-03-08 ENCOUNTER — OFFICE VISIT (OUTPATIENT)
Dept: PRIMARY CARE CLINIC | Age: 68
End: 2023-03-08
Payer: COMMERCIAL

## 2023-03-08 VITALS
BODY MASS INDEX: 34.45 KG/M2 | TEMPERATURE: 98.3 F | WEIGHT: 187.2 LBS | SYSTOLIC BLOOD PRESSURE: 142 MMHG | OXYGEN SATURATION: 98 % | HEIGHT: 62 IN | HEART RATE: 75 BPM | DIASTOLIC BLOOD PRESSURE: 90 MMHG

## 2023-03-08 DIAGNOSIS — E78.2 MIXED HYPERLIPIDEMIA: ICD-10-CM

## 2023-03-08 DIAGNOSIS — F32.5 MAJOR DEPRESSIVE DISORDER WITH SINGLE EPISODE, IN FULL REMISSION (HCC): ICD-10-CM

## 2023-03-08 DIAGNOSIS — E66.09 CLASS 1 OBESITY DUE TO EXCESS CALORIES WITHOUT SERIOUS COMORBIDITY WITH BODY MASS INDEX (BMI) OF 34.0 TO 34.9 IN ADULT: ICD-10-CM

## 2023-03-08 DIAGNOSIS — R73.03 PREDIABETES: ICD-10-CM

## 2023-03-08 DIAGNOSIS — I16.1 HYPERTENSIVE EMERGENCY: Primary | ICD-10-CM

## 2023-03-08 PROCEDURE — 99215 OFFICE O/P EST HI 40 MIN: CPT

## 2023-03-08 PROCEDURE — 3077F SYST BP >= 140 MM HG: CPT

## 2023-03-08 PROCEDURE — 3079F DIAST BP 80-89 MM HG: CPT

## 2023-03-08 PROCEDURE — 1123F ACP DISCUSS/DSCN MKR DOCD: CPT

## 2023-03-08 ASSESSMENT — PATIENT HEALTH QUESTIONNAIRE - PHQ9
7. TROUBLE CONCENTRATING ON THINGS, SUCH AS READING THE NEWSPAPER OR WATCHING TELEVISION: 0
SUM OF ALL RESPONSES TO PHQ9 QUESTIONS 1 & 2: 0
10. IF YOU CHECKED OFF ANY PROBLEMS, HOW DIFFICULT HAVE THESE PROBLEMS MADE IT FOR YOU TO DO YOUR WORK, TAKE CARE OF THINGS AT HOME, OR GET ALONG WITH OTHER PEOPLE: 1
SUM OF ALL RESPONSES TO PHQ QUESTIONS 1-9: 1
4. FEELING TIRED OR HAVING LITTLE ENERGY: 1
SUM OF ALL RESPONSES TO PHQ QUESTIONS 1-9: 1
6. FEELING BAD ABOUT YOURSELF - OR THAT YOU ARE A FAILURE OR HAVE LET YOURSELF OR YOUR FAMILY DOWN: 0
9. THOUGHTS THAT YOU WOULD BE BETTER OFF DEAD, OR OF HURTING YOURSELF: 0
2. FEELING DOWN, DEPRESSED OR HOPELESS: 0
1. LITTLE INTEREST OR PLEASURE IN DOING THINGS: 0
SUM OF ALL RESPONSES TO PHQ QUESTIONS 1-9: 1
SUM OF ALL RESPONSES TO PHQ QUESTIONS 1-9: 1
3. TROUBLE FALLING OR STAYING ASLEEP: 0
8. MOVING OR SPEAKING SO SLOWLY THAT OTHER PEOPLE COULD HAVE NOTICED. OR THE OPPOSITE, BEING SO FIGETY OR RESTLESS THAT YOU HAVE BEEN MOVING AROUND A LOT MORE THAN USUAL: 0

## 2023-03-08 ASSESSMENT — ANXIETY QUESTIONNAIRES
1. FEELING NERVOUS, ANXIOUS, OR ON EDGE: 2
3. WORRYING TOO MUCH ABOUT DIFFERENT THINGS: 1
7. FEELING AFRAID AS IF SOMETHING AWFUL MIGHT HAPPEN: 1
5. BEING SO RESTLESS THAT IT IS HARD TO SIT STILL: 0
IF YOU CHECKED OFF ANY PROBLEMS ON THIS QUESTIONNAIRE, HOW DIFFICULT HAVE THESE PROBLEMS MADE IT FOR YOU TO DO YOUR WORK, TAKE CARE OF THINGS AT HOME, OR GET ALONG WITH OTHER PEOPLE: SOMEWHAT DIFFICULT

## 2023-03-08 ASSESSMENT — ENCOUNTER SYMPTOMS
DIARRHEA: 0
ABDOMINAL PAIN: 0
VOMITING: 0
NAUSEA: 0
SHORTNESS OF BREATH: 0

## 2023-03-09 ENCOUNTER — OFFICE VISIT (OUTPATIENT)
Dept: PRIMARY CARE CLINIC | Age: 68
End: 2023-03-09
Payer: COMMERCIAL

## 2023-03-09 VITALS
HEART RATE: 70 BPM | SYSTOLIC BLOOD PRESSURE: 138 MMHG | HEIGHT: 62 IN | BODY MASS INDEX: 34.6 KG/M2 | OXYGEN SATURATION: 98 % | RESPIRATION RATE: 16 BRPM | DIASTOLIC BLOOD PRESSURE: 82 MMHG | WEIGHT: 188 LBS | TEMPERATURE: 97.5 F

## 2023-03-09 DIAGNOSIS — I10 HYPERTENSION, UNSPECIFIED TYPE: Primary | ICD-10-CM

## 2023-03-09 DIAGNOSIS — R73.03 PREDIABETES: ICD-10-CM

## 2023-03-09 DIAGNOSIS — E78.2 MIXED HYPERLIPIDEMIA: ICD-10-CM

## 2023-03-09 DIAGNOSIS — Z23 NEED FOR VACCINATION: ICD-10-CM

## 2023-03-09 PROCEDURE — 1123F ACP DISCUSS/DSCN MKR DOCD: CPT

## 2023-03-09 PROCEDURE — 3075F SYST BP GE 130 - 139MM HG: CPT

## 2023-03-09 PROCEDURE — 3079F DIAST BP 80-89 MM HG: CPT

## 2023-03-09 PROCEDURE — 99214 OFFICE O/P EST MOD 30 MIN: CPT

## 2023-03-09 RX ORDER — LISINOPRIL 5 MG/1
5 TABLET ORAL DAILY
Qty: 90 TABLET | Refills: 1 | Status: SHIPPED | OUTPATIENT
Start: 2023-03-09

## 2023-03-09 RX ORDER — ZOSTER VACCINE RECOMBINANT, ADJUVANTED 50 MCG/0.5
0.5 KIT INTRAMUSCULAR SEE ADMIN INSTRUCTIONS
Qty: 0.5 ML | Refills: 0 | Status: SHIPPED | OUTPATIENT
Start: 2023-03-09 | End: 2023-09-05

## 2023-03-09 ASSESSMENT — ENCOUNTER SYMPTOMS
SINUS PRESSURE: 0
ABDOMINAL PAIN: 0
COUGH: 0
SHORTNESS OF BREATH: 0
WHEEZING: 0

## 2023-03-09 NOTE — PROGRESS NOTES
[address] and [facility] Medicine Residency [facility]                                             [address] [place] [address], [address] [place] [address]        Phone: [phone]      Name:  Margarito Liang  :    1955    Consultants:   Patient Care Team:  Helen Avalos DO as PCP - General (Family Medicine)  Gloria Enamorado MD (Gastroenterology)    Chief Complaint:     Margarito Liang is a 74 y.o. female  who presents today for an established patient care visit with Personalized Prevention Plan Services as noted below. HPI:     Patient is a 29-year-old female presenting today for follow-up regarding hypertensive emergency, Prediabetes, mixed hyperlipidemia. Hypertension  Patient was seen yesterday in office and was noted to have elevated blood pressure readings. Initial blood pressure on presentation was 170/84 with follow-up measurement of 142/90. During this time patient was experiencing chest pain, headaches, vision changes. At this time she was also stating that she believes most of the symptoms were secondary to stress as her son had recently been hospitalized. Today patient states that she is noticing considerable improvement of symptoms yesterday. She is denying any headaches, vision changes, lightheadedness, chest pain. She states that she is feeling much better likely due to the fact that her son has improved clinically in the hospital.  Patient denies any past medical history of hypertension. Patient denies any past history of cardiovascular risk factors. No additional concerns noted today regarding hypertension. Prediabetes  Last A1c of 5.9 on 2023  Patient previously on metformin, however not currently taking any glycemic control medications  Patient states that she has been trying to exercise and eat healthy diet in order for proper management of her prediabetes.     Hyperlipidemia  Lab Results Component Value Date    CHOL 292 (H) 02/21/2023    TRIG 288 (H) 02/21/2023    HDL 56 02/21/2023    LDLCALC 178 (H) 02/21/2023    LABVLDL 58 02/21/2023   With 10-year ASCVD risk score of 7.8%. Patient not currently on statin therapy  Patient states that she has been trying to exercise and eating healthy diet for management of her hyperlipidemia. Patient Active Problem List   Diagnosis    TERENCE (generalized anxiety disorder)    Mixed hyperlipidemia    Plantar fasciitis, bilateral    Migraine with aura and without status migrainosus, not intractable    Prediabetes    Fibromyalgia    Carpal tunnel syndrome of right wrist    Gastroesophageal reflux disease without esophagitis    Class 1 obesity due to excess calories without serious comorbidity with body mass index (BMI) of 34.0 to 34.9 in adult    Major depressive disorder with single episode, in full remission [facility])         Past Medical History:    Past Medical History:   Diagnosis Date    Arthritis     Depression     Hyperlipidemia     Thyroid disease        Past Surgical History:  Past Surgical History:   Procedure Laterality Date    BREAST LUMPECTOMY      CHOLECYSTECTOMY      COLONOSCOPY  03/06/2017    polyps    HYSTERECTOMY (CERVIX STATUS UNKNOWN)         Home Meds:  Prior to Visit Medications    Medication Sig Taking?  Authorizing Provider   lisinopril (PRINIVIL;ZESTRIL) 5 MG tablet Take 1 tablet by mouth daily Yes Liane Servin DO   zoster recombinant adjuvanted vaccine (SHINGRIX) 50 MCG/0.5ML SUSR injection Inject 0.5 mLs into the muscle See Admin Instructions 1 dose now and repeat in 2-6 months Yes Liane Servin DO   Tdap (ADACEL) 5-2-15.5 LF-MCG/0.5 injection Inject 0.5 mLs into the muscle once for 1 dose Yes Liane Servin DO   Omega-3 Fatty Acids (FISH OIL) 1000 MG capsule Take by mouth daily Yes Historical Provider, MD   Multiple Vitamins-Minerals (ONE-A-DAY WOMENS VITACRAVES PO) Take by mouth Yes Historical Provider, MD acetaminophen (TYLENOL) 500 MG tablet Take 500 mg by mouth every 6 hours as needed for Pain Yes Historical Provider, MD   aspirin 81 MG tablet Take 81 mg by mouth daily. Yes Historical Provider, MD       Allergies:    Patient has no known allergies. Family History:       Problem Relation Age of Onset    Lung Cancer Mother     Diabetes Father     Other Sister         possible colon cancer, pt unsure         Health Maintenance Completed:  Health Maintenance   Topic Date Due    Pneumococcal 65+ years Vaccine (1 - PCV) Never done    HIV screen  Never done    Hepatitis C screen  Never done    Colorectal Cancer Screen  Never done    Breast cancer screen  Never done    Shingles vaccine (1 of 2) Never done    DEXA (modify frequency per FRAX score)  Never done    DTaP/Tdap/Td vaccine (2 - Td or Tdap) 06/22/2021    COVID-19 Vaccine (4 - Booster for Pfizer series) 12/21/2021    Flu vaccine (1) 08/01/2022    A1C test (Diabetic or Prediabetic)  02/21/2024    Depression Monitoring  03/08/2024    Lipids  02/21/2028    Hepatitis A vaccine  Aged Out    Hib vaccine  Aged Out    Meningococcal (ACWY) vaccine  Aged Out          Immunization History   Administered Date(s) Administered    COVID-19, PFIZER PURPLE top, DILUTE for use, (age 15 y+), 30mcg/0.3mL 12/21/2020, 01/11/2021, 10/26/2021    Influenza Virus Vaccine 11/13/2018    Tdap (Boostrix, Adacel) 06/22/2011         Review of Systems:  Review of Systems   Constitutional:  Negative for chills, fatigue and fever. HENT:  Negative for congestion and sinus pressure. Respiratory:  Negative for cough, shortness of breath and wheezing. Cardiovascular:  Negative for chest pain, palpitations and leg swelling. Gastrointestinal:  Negative for abdominal pain. Neurological:  Negative for dizziness, facial asymmetry, light-headedness and headaches.       Physical Exam:   Vitals:    03/09/23 1320   BP: 138/82   Pulse: 70   Resp: 16   Temp: 97.5 °F (36.4 °C)   TempSrc: Temporal SpO2: 98%   Weight: 188 lb (85.3 kg)   Height: 5' 2\" (1.575 m)     Body mass index is 34.39 kg/m². Wt Readings from Last 3 Encounters:   03/09/23 188 lb (85.3 kg)   03/08/23 187 lb 3.2 oz (84.9 kg)   02/21/23 186 lb (84.4 kg)       BP Readings from Last 3 Encounters:   03/09/23 138/82   03/08/23 (!) 142/90   02/21/23 136/74       Physical Exam  Constitutional:       Appearance: Normal appearance. HENT:      Head: Normocephalic. Right Ear: External ear normal.      Left Ear: External ear normal.   Cardiovascular:      Rate and Rhythm: Normal rate and regular rhythm. Pulses: Normal pulses. Heart sounds: Normal heart sounds. Pulmonary:      Effort: Pulmonary effort is normal.      Breath sounds: Normal breath sounds. Abdominal:      General: Abdomen is flat. Palpations: Abdomen is soft. Skin:     General: Skin is warm. Neurological:      General: No focal deficit present. Mental Status: She is alert.             Lab Review:   Office Visit on 02/21/2023   Component Date Value    Hemoglobin A1C 02/21/2023 5.9     eAG 02/21/2023 122.6     Cholesterol, Total 02/21/2023 292 (A)     Triglycerides 02/21/2023 288 (A)     HDL 02/21/2023 56     LDL Calculated 02/21/2023 178 (A)     VLDL Cholesterol Calcula* 02/21/2023 58     Sodium 02/21/2023 146 (A)     Potassium 02/21/2023 3.8     Chloride 02/21/2023 105     CO2 02/21/2023 23     Anion Gap 02/21/2023 18 (A)     Glucose 02/21/2023 87     BUN 02/21/2023 8     Creatinine 02/21/2023 0.8     Est, Glom Filt Rate 02/21/2023 >60     Calcium 02/21/2023 10.0     Total Protein 02/21/2023 7.0     Albumin 02/21/2023 4.3     Albumin/Globulin Ratio 02/21/2023 1.6     Total Bilirubin 02/21/2023 0.5     Alkaline Phosphatase 02/21/2023 63     ALT 02/21/2023 27     AST 02/21/2023 23     WBC 02/21/2023 9.9     RBC 02/21/2023 4.83     Hemoglobin 02/21/2023 14.6     Hematocrit 02/21/2023 43.3     MCV 02/21/2023 89.7     MCH 02/21/2023 30.2     MCHC 02/21/2023 33.7     RDW 02/21/2023 13.3     Platelets 96/65/7508 206     MPV 02/21/2023 10.7 (A)     Neutrophils % 02/21/2023 54.3     Lymphocytes % 02/21/2023 35.0     Monocytes % 02/21/2023 8.0     Eosinophils % 02/21/2023 1.9     Basophils % 02/21/2023 0.8     Neutrophils Absolute 02/21/2023 5.4     Lymphocytes Absolute 02/21/2023 3.5     Monocytes Absolute 02/21/2023 0.8     Eosinophils Absolute 02/21/2023 0.2     Basophils Absolute 02/21/2023 0.1           Assessment/Plan:  Erin Norwood was seen today for hypertension. Diagnoses and all orders for this visit:    1. Hypertension, unspecified type  Uncontrolled  Denying concerns of chest pain, headaches, vision changes  Due to blood pressure elevation today of 138/82, yesterday of 142/90 (after recheck), and 136/74 on 2/21/2023 we will initiate treatment for hypertension today  Patient will be started on lisinopril 5 mg daily  Patient was given blood pressure log to monitor blood pressure at home  Discussion was also had with patient regarding DASH diet  We will follow-up with patient in 2 weeks for reassessment of blood pressure management, patient is agreeable to additional testing in 2 weeks  Would like for CMP and urine micro at this time due to initiation of ACE inhibitor  - lisinopril (PRINIVIL;ZESTRIL) 5 MG tablet; Take 1 tablet by mouth daily  Dispense: 90 tablet; Refill: 1    2. Prediabetes  Uncontrolled  Last A1c of 5.9 measured 2/21/2023  Discussed dietary modification and lifestyle modification for reduction of blood glucose levels  Patient has previously been on metformin, however opted to discontinue this medication as she felt that her glycemic control was well controlled  We will repeat A1c measurements on or after 5/22/2023    3.  Mixed hyperlipidemia  Uncontrolled  Last lipid panel 2/21/2023 with , , HDL 56,   Patient with ASCVD risk score of 7.8%, decreased from 8.2 yesterday likely secondary to improve control of patient's blood pressure  We have discussed lifestyle management as patient prefers for nonpharmacological management of conditions  Recommend 150 minutes of cardiovascular activity a week, moderate intensity  Increase intake of fruits and vegetables  Minimize packaged foods      4. Need for vaccination  - zoster recombinant adjuvanted vaccine [facility]) 50 MCG/0.5ML SUSR injection; Inject 0.5 mLs into the muscle See Admin Instructions 1 dose now and repeat in 2-6 months  Dispense: 0.5 mL; Refill: 0  - Tdap (ADACEL) 5-2-15.5 LF-MCG/0.5 injection; Inject 0.5 mLs into the muscle once for 1 dose  Dispense: 0.5 mL; Refill: 0        Health Maintenance Due:  Health Maintenance Due   Topic Date Due    Pneumococcal 65+ years Vaccine (1 - PCV) Never done    HIV screen  Never done    Hepatitis C screen  Never done    Colorectal Cancer Screen  Never done    Breast cancer screen  Never done    Shingles vaccine (1 of 2) Never done    DEXA (modify frequency per FRAX score)  Never done    DTaP/Tdap/Td vaccine (2 - Td or Tdap) 06/22/2021    COVID-19 Vaccine (4 - Booster for Pfizer series) 12/21/2021    Flu vaccine (1) 08/01/2022          Health care decision maker:  up to date:  already done      Health Maintenance: (USPSTF Recommendations)  (F) Breast Cancer Screen: (40-49 (C), 50-74 biennial screening mammogram (B)): Ordered, awaiting completion  CRC/Colonoscopy Screening: (adults 45-49 (B), 50-75 (A)): Awaiting results from prior colonoscopy in care everywhere  DEXA Screen: (women >65 and older, <65 if at risk/postmenopausal (B)): Ordered, awaiting completion  Immunizations: Discussed vaccinations. Due to patient being medicare will need shingles and Tdap through pharmacy, will go through care everywhere regarding pneumococcal vaccine    RTC:  Return in about 2 weeks (around 3/23/2023). For AWV introduction visit and recheck on blood pressure with urine micro and CMP.     EMR Dragon/transcription disclaimer:  Much of this encounter note is electronic transcription/translation of spoken language to printed texts. The electronic translation of spoken language may be erroneous, or at times, nonsensical words or phrases may be inadvertently transcribed.   Although I have reviewed the note for such errors, some may still exist.

## 2023-03-21 ENCOUNTER — OFFICE VISIT (OUTPATIENT)
Dept: PRIMARY CARE CLINIC | Age: 68
End: 2023-03-21

## 2023-03-21 VITALS
TEMPERATURE: 97.5 F | HEIGHT: 60 IN | SYSTOLIC BLOOD PRESSURE: 138 MMHG | HEART RATE: 63 BPM | OXYGEN SATURATION: 98 % | BODY MASS INDEX: 36.91 KG/M2 | WEIGHT: 188 LBS | DIASTOLIC BLOOD PRESSURE: 82 MMHG

## 2023-03-21 DIAGNOSIS — I10 ESSENTIAL HYPERTENSION: ICD-10-CM

## 2023-03-21 DIAGNOSIS — E66.09 CLASS 1 OBESITY DUE TO EXCESS CALORIES WITHOUT SERIOUS COMORBIDITY WITH BODY MASS INDEX (BMI) OF 34.0 TO 34.9 IN ADULT: ICD-10-CM

## 2023-03-21 DIAGNOSIS — Z00.00 INITIAL MEDICARE ANNUAL WELLNESS VISIT: Primary | ICD-10-CM

## 2023-03-21 DIAGNOSIS — E78.2 MIXED HYPERLIPIDEMIA: ICD-10-CM

## 2023-03-21 ASSESSMENT — LIFESTYLE VARIABLES: HOW OFTEN DO YOU HAVE A DRINK CONTAINING ALCOHOL: NEVER

## 2023-03-21 ASSESSMENT — PATIENT HEALTH QUESTIONNAIRE - PHQ9
8. MOVING OR SPEAKING SO SLOWLY THAT OTHER PEOPLE COULD HAVE NOTICED. OR THE OPPOSITE, BEING SO FIGETY OR RESTLESS THAT YOU HAVE BEEN MOVING AROUND A LOT MORE THAN USUAL: 0
SUM OF ALL RESPONSES TO PHQ9 QUESTIONS 1 & 2: 2
10. IF YOU CHECKED OFF ANY PROBLEMS, HOW DIFFICULT HAVE THESE PROBLEMS MADE IT FOR YOU TO DO YOUR WORK, TAKE CARE OF THINGS AT HOME, OR GET ALONG WITH OTHER PEOPLE: 0
SUM OF ALL RESPONSES TO PHQ QUESTIONS 1-9: 2
3. TROUBLE FALLING OR STAYING ASLEEP: 0
1. LITTLE INTEREST OR PLEASURE IN DOING THINGS: 1
5. POOR APPETITE OR OVEREATING: 0
9. THOUGHTS THAT YOU WOULD BE BETTER OFF DEAD, OR OF HURTING YOURSELF: 0
SUM OF ALL RESPONSES TO PHQ QUESTIONS 1-9: 2
6. FEELING BAD ABOUT YOURSELF - OR THAT YOU ARE A FAILURE OR HAVE LET YOURSELF OR YOUR FAMILY DOWN: 0
SUM OF ALL RESPONSES TO PHQ QUESTIONS 1-9: 2
4. FEELING TIRED OR HAVING LITTLE ENERGY: 0
2. FEELING DOWN, DEPRESSED OR HOPELESS: 1
7. TROUBLE CONCENTRATING ON THINGS, SUCH AS READING THE NEWSPAPER OR WATCHING TELEVISION: 0
SUM OF ALL RESPONSES TO PHQ QUESTIONS 1-9: 2

## 2023-03-21 NOTE — PATIENT INSTRUCTIONS
minerals. High-fiber foods include whole-grain cereals and breads, oatmeal, beans, brown rice, citrus fruits, and apples.     Eat lean proteins. Heart-healthy proteins include seafood, lean meats and poultry, eggs, beans, peas, nuts, seeds, and soy products.     Limit drinks and foods with added sugar. These include candy, desserts, and soda pop. Lifestyle changes    If your doctor recommends it, get more exercise. Walking is a good choice. Bit by bit, increase the amount you walk every day. Try for at least 30 minutes on most days of the week. You also may want to swim, bike, or do other activities.     Do not smoke. If you need help quitting, talk to your doctor about stop-smoking programs and medicines. These can increase your chances of quitting for good. Quitting smoking may be the most important step you can take to protect your heart. It is never too late to quit.     Limit alcohol to 2 drinks a day for men and 1 drink a day for women. Too much alcohol can cause health problems.     Manage other health problems such as diabetes, high blood pressure, and high cholesterol. If you think you may have a problem with alcohol or drug use, talk to your doctor. Medicines    Take your medicines exactly as prescribed. Call your doctor if you think you are having a problem with your medicine.     If your doctor recommends aspirin, take the amount directed each day. Make sure you take aspirin and not another kind of pain reliever, such as acetaminophen (Tylenol). When should you call for help? Call 911 if you have symptoms of a heart attack. These may include:    Chest pain or pressure, or a strange feeling in the chest.     Sweating.     Shortness of breath.     Pain, pressure, or a strange feeling in the back, neck, jaw, or upper belly or in one or both shoulders or arms.     Lightheadedness or sudden weakness.     A fast or irregular heartbeat.    After you call 911, the  may tell you to chew 1

## 2023-03-21 NOTE — PROGRESS NOTES
Medicare Annual Wellness Visit    Anne Munroe is a 74year old female with past medical history of prediabetes, depression, obesity, migraine, fibromyalgia, GERD who presents for follow-up of the following conditions:    Assessment & Plan     Initial Medicare annual wellness visit  - Vision: encouraged completion of annual vision screening given that it has been approximately 4 years and patient has new diagnosis of HTN  - Falls: patient has no concerns for falls at home and does not desire further intervention  - Living will:patient given living will packet, declined referral to spiritual services for assistance in filling this out as patient is well connected with her   - Obesity: see plan below    Essential hypertension  - Uncontrolled  - /82 today  - Has not yet picked up lisinopril 5mg, encouraged compliance with this medication  - Ordered urine microalbumin/creatinine  - RTC in 3 months to discuss BP  control    Mixed hyperlipidemia  -Uncontrolled  - last lipid panel 2/21/23 , , HDL 56,   - Prior calculated ASCVD risk was 8.2%  - Previously discussed ASCVD risk, patient opted for lifestyle modifications despite recommendations from USPSTF for initiation of statin therapy  - RTC In 3 months to discuss lifestyle changes    Class 1 obesity due to excess calories without serious comorbidity with body mass index (BMI) of 34.0 to 34.9 in adult  - Uncontrolled  - BMI 36  - Patient was previously exercising 3 times per week at [place], agreeable to resuming this activity  - Recommend 150 minutes of moderate intensity physical activity per week  - RTC in 3 months        Recommendations for Preventive Services Due: see orders and patient instructions/AVS.  Recommended screening schedule for the next 5-10 years is provided to the patient in written form: see Patient Instructions/AVS.     Return in about 3 months (around 6/21/2023) for chronic conditions.      Subjective   The

## 2023-04-05 ENCOUNTER — HOSPITAL ENCOUNTER (OUTPATIENT)
Dept: WOMENS IMAGING | Age: 68
Discharge: HOME | End: 2023-04-05
Payer: COMMERCIAL

## 2023-04-05 DIAGNOSIS — Z00.00 HEALTHCARE MAINTENANCE: ICD-10-CM

## 2023-04-05 DIAGNOSIS — Z13.820 ENCOUNTER FOR SCREENING FOR OSTEOPOROSIS: ICD-10-CM

## 2023-04-05 DIAGNOSIS — M81.0 AGE-RELATED OSTEOPOROSIS WITHOUT CURRENT PATHOLOGICAL FRACTURE: ICD-10-CM

## 2023-04-05 DIAGNOSIS — Z12.31 BREAST CANCER SCREENING BY MAMMOGRAM: ICD-10-CM

## 2023-04-05 DIAGNOSIS — Z78.0 POST-MENOPAUSE: ICD-10-CM

## 2023-04-05 PROCEDURE — 77080 DXA BONE DENSITY AXIAL: CPT

## 2023-04-05 PROCEDURE — 77063 BREAST TOMOSYNTHESIS BI: CPT

## 2023-05-15 ENCOUNTER — OFFICE VISIT (OUTPATIENT)
Dept: PRIMARY CARE CLINIC | Age: 68
End: 2023-05-15
Payer: COMMERCIAL

## 2023-05-15 VITALS
SYSTOLIC BLOOD PRESSURE: 118 MMHG | HEART RATE: 88 BPM | TEMPERATURE: 97.2 F | OXYGEN SATURATION: 98 % | DIASTOLIC BLOOD PRESSURE: 62 MMHG | RESPIRATION RATE: 16 BRPM | WEIGHT: 189.4 LBS | HEIGHT: 59 IN | BODY MASS INDEX: 38.18 KG/M2

## 2023-05-15 DIAGNOSIS — E66.01 CLASS 2 SEVERE OBESITY WITH SERIOUS COMORBIDITY AND BODY MASS INDEX (BMI) OF 36.0 TO 36.9 IN ADULT, UNSPECIFIED OBESITY TYPE (HCC): ICD-10-CM

## 2023-05-15 DIAGNOSIS — R73.03 PREDIABETES: ICD-10-CM

## 2023-05-15 DIAGNOSIS — I10 ESSENTIAL HYPERTENSION: Primary | ICD-10-CM

## 2023-05-15 DIAGNOSIS — Z00.00 HEALTHCARE MAINTENANCE: ICD-10-CM

## 2023-05-15 DIAGNOSIS — E78.2 MIXED HYPERLIPIDEMIA: ICD-10-CM

## 2023-05-15 PROBLEM — K63.5 POLYP OF COLON: Status: ACTIVE | Noted: 2017-06-09

## 2023-05-15 PROBLEM — B02.9 HERPES ZOSTER WITHOUT COMPLICATION: Status: ACTIVE | Noted: 2017-11-13

## 2023-05-15 PROBLEM — K57.30 SIGMOID DIVERTICULOSIS: Status: ACTIVE | Noted: 2017-06-09

## 2023-05-15 PROCEDURE — 3078F DIAST BP <80 MM HG: CPT

## 2023-05-15 PROCEDURE — 99214 OFFICE O/P EST MOD 30 MIN: CPT

## 2023-05-15 PROCEDURE — 1123F ACP DISCUSS/DSCN MKR DOCD: CPT

## 2023-05-15 PROCEDURE — 3074F SYST BP LT 130 MM HG: CPT

## 2023-05-15 ASSESSMENT — PATIENT HEALTH QUESTIONNAIRE - PHQ9
9. THOUGHTS THAT YOU WOULD BE BETTER OFF DEAD, OR OF HURTING YOURSELF: 0
5. POOR APPETITE OR OVEREATING: 0
1. LITTLE INTEREST OR PLEASURE IN DOING THINGS: 0
4. FEELING TIRED OR HAVING LITTLE ENERGY: 2
3. TROUBLE FALLING OR STAYING ASLEEP: 1
SUM OF ALL RESPONSES TO PHQ QUESTIONS 1-9: 4
6. FEELING BAD ABOUT YOURSELF - OR THAT YOU ARE A FAILURE OR HAVE LET YOURSELF OR YOUR FAMILY DOWN: 0
SUM OF ALL RESPONSES TO PHQ9 QUESTIONS 1 & 2: 0
10. IF YOU CHECKED OFF ANY PROBLEMS, HOW DIFFICULT HAVE THESE PROBLEMS MADE IT FOR YOU TO DO YOUR WORK, TAKE CARE OF THINGS AT HOME, OR GET ALONG WITH OTHER PEOPLE: 0
2. FEELING DOWN, DEPRESSED OR HOPELESS: 0
7. TROUBLE CONCENTRATING ON THINGS, SUCH AS READING THE NEWSPAPER OR WATCHING TELEVISION: 1
SUM OF ALL RESPONSES TO PHQ QUESTIONS 1-9: 4
SUM OF ALL RESPONSES TO PHQ QUESTIONS 1-9: 4
8. MOVING OR SPEAKING SO SLOWLY THAT OTHER PEOPLE COULD HAVE NOTICED. OR THE OPPOSITE, BEING SO FIGETY OR RESTLESS THAT YOU HAVE BEEN MOVING AROUND A LOT MORE THAN USUAL: 0
SUM OF ALL RESPONSES TO PHQ QUESTIONS 1-9: 4

## 2023-05-15 ASSESSMENT — ANXIETY QUESTIONNAIRES
6. BECOMING EASILY ANNOYED OR IRRITABLE: 0
5. BEING SO RESTLESS THAT IT IS HARD TO SIT STILL: 1
IF YOU CHECKED OFF ANY PROBLEMS ON THIS QUESTIONNAIRE, HOW DIFFICULT HAVE THESE PROBLEMS MADE IT FOR YOU TO DO YOUR WORK, TAKE CARE OF THINGS AT HOME, OR GET ALONG WITH OTHER PEOPLE: NOT DIFFICULT AT ALL
2. NOT BEING ABLE TO STOP OR CONTROL WORRYING: 1
7. FEELING AFRAID AS IF SOMETHING AWFUL MIGHT HAPPEN: 0
4. TROUBLE RELAXING: 1
GAD7 TOTAL SCORE: 5
1. FEELING NERVOUS, ANXIOUS, OR ON EDGE: 1
3. WORRYING TOO MUCH ABOUT DIFFERENT THINGS: 1

## 2023-05-15 ASSESSMENT — ENCOUNTER SYMPTOMS
VOMITING: 0
ABDOMINAL PAIN: 0
SHORTNESS OF BREATH: 0
WHEEZING: 0
COUGH: 0
NAUSEA: 0
DIARRHEA: 0
RHINORRHEA: 0
BACK PAIN: 0
SINUS PRESSURE: 0
SORE THROAT: 0

## 2023-05-15 NOTE — PROGRESS NOTES
[facility]  [address] [facility], [address] [facility], [address] [facility] [address]  Phone: [phone]      Name:  Calvin Alberto  :    1955    Consultants:   Patient Care Team:  Tyra Gambino DO as PCP - General (Family Medicine)  Keira Ybarra MD (Gastroenterology)    Chief Complaint:     Calvin Alberto is a 74 y.o. female who presents today for an established patient care visit with Personalized Prevention Plan Services as noted below. HPI:     Calvin Alberto is a 74 y.o. 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 Tries to walk around a lot. - Tries to eat healthier and less butter/fats. HLD  - last lipid panel 23 , , HDL 56,   - Prior calculated ASCVD risk was 8.2%  - Pt declined statins at last visit 3/21    Obesity  - Body mass index is 38.25 kg/m². - See above about foot and exercise. Healthcare maintenance  - Colon cancer: 5/3/2023 with 2 sessile polyps removed, rec repeat in 5 yrs  - Breast cancer: mammogram normal on 2023  - DEXA screen: normal on 2023  - HIV and HepC screening due  - Vaccines due: COVID vacc done. Tdap done 3/2023. PCV due, stated she may have gotten this from her employer. Shingrix 1st dose done 3/10/2023, due for 2nd dose, will be getting at [facility].          Patient Active Problem List   Diagnosis    TERENCE (generalized anxiety disorder)    Mixed hyperlipidemia    Plantar fasciitis, bilateral    Migraine with aura and without status migrainosus, not intractable    Prediabetes    Fibromyalgia    Carpal tunnel syndrome of

## 2023-05-16 LAB
CREAT UR-MCNC: 207.4 MG/DL (ref 28–259)
MICROALBUMIN UR DL<=1MG/L-MCNC: 3.1 MG/DL
MICROALBUMIN/CREAT UR: 14.9 MG/G (ref 0–30)

## 2024-07-08 ENCOUNTER — HOSPITAL ENCOUNTER (EMERGENCY)
Age: 69
Discharge: HOME | End: 2024-07-09
Attending: EMERGENCY MEDICINE
Payer: COMMERCIAL

## 2024-07-08 DIAGNOSIS — T18.108A FOREIGN BODY IN ESOPHAGUS, INITIAL ENCOUNTER: Primary | ICD-10-CM

## 2024-07-08 LAB
ANION GAP SERPL CALCULATED.3IONS-SCNC: 11 MMOL/L (ref 3–16)
BASOPHILS # BLD: 0.1 K/UL (ref 0–0.2)
BASOPHILS NFR BLD: 0.6 %
BUN SERPL-MCNC: 7 MG/DL (ref 7–20)
CALCIUM SERPL-MCNC: 9.3 MG/DL (ref 8.3–10.6)
CHLORIDE SERPL-SCNC: 103 MMOL/L (ref 99–110)
CO2 SERPL-SCNC: 29 MMOL/L (ref 21–32)
CREAT SERPL-MCNC: 0.8 MG/DL (ref 0.6–1.2)
DEPRECATED RDW RBC AUTO: 13.8 % (ref 12.4–15.4)
EOSINOPHIL # BLD: 0.2 K/UL (ref 0–0.6)
EOSINOPHIL NFR BLD: 1.9 %
GFR SERPLBLD CREATININE-BSD FMLA CKD-EPI: 81 ML/MIN/{1.73_M2}
GLUCOSE SERPL-MCNC: 133 MG/DL (ref 70–99)
HCT VFR BLD AUTO: 41.5 % (ref 36–48)
HGB BLD-MCNC: 14.2 G/DL (ref 12–16)
LYMPHOCYTES # BLD: 2.8 K/UL (ref 1–5.1)
LYMPHOCYTES NFR BLD: 25.6 %
MAGNESIUM SERPL-MCNC: 1.8 MG/DL (ref 1.8–2.4)
MCH RBC QN AUTO: 30 PG (ref 26–34)
MCHC RBC AUTO-ENTMCNC: 34.3 G/DL (ref 31–36)
MCV RBC AUTO: 87.5 FL (ref 80–100)
MONOCYTES # BLD: 0.9 K/UL (ref 0–1.3)
MONOCYTES NFR BLD: 8 %
NEUTROPHILS # BLD: 7 K/UL (ref 1.7–7.7)
NEUTROPHILS NFR BLD: 63.9 %
PLATELET # BLD AUTO: 228 K/UL (ref 135–450)
PMV BLD AUTO: 9.5 FL (ref 5–10.5)
POTASSIUM SERPL-SCNC: 3.1 MMOL/L (ref 3.5–5.1)
RBC # BLD AUTO: 4.75 M/UL (ref 4–5.2)
SODIUM SERPL-SCNC: 143 MMOL/L (ref 136–145)
WBC # BLD AUTO: 11 K/UL (ref 4–11)

## 2024-07-08 PROCEDURE — 99152 MOD SED SAME PHYS/QHP 5/>YRS: CPT | Performed by: INTERNAL MEDICINE

## 2024-07-08 PROCEDURE — 80048 BASIC METABOLIC PNL TOTAL CA: CPT

## 2024-07-08 PROCEDURE — 36415 COLL VENOUS BLD VENIPUNCTURE: CPT

## 2024-07-08 PROCEDURE — 2580000003 HC RX 258: Performed by: EMERGENCY MEDICINE

## 2024-07-08 PROCEDURE — 6360000002 HC RX W HCPCS: Performed by: INTERNAL MEDICINE

## 2024-07-08 PROCEDURE — 2709999900 HC NON-CHARGEABLE SUPPLY: Performed by: INTERNAL MEDICINE

## 2024-07-08 PROCEDURE — 96374 THER/PROPH/DIAG INJ IV PUSH: CPT

## 2024-07-08 PROCEDURE — 83735 ASSAY OF MAGNESIUM: CPT

## 2024-07-08 PROCEDURE — 96375 TX/PRO/DX INJ NEW DRUG ADDON: CPT

## 2024-07-08 PROCEDURE — 99284 EMERGENCY DEPT VISIT MOD MDM: CPT

## 2024-07-08 PROCEDURE — 6360000002 HC RX W HCPCS: Performed by: EMERGENCY MEDICINE

## 2024-07-08 PROCEDURE — 3609012900 HC EGD FOREIGN BODY REMOVAL: Performed by: INTERNAL MEDICINE

## 2024-07-08 PROCEDURE — 85025 COMPLETE CBC W/AUTO DIFF WBC: CPT

## 2024-07-08 PROCEDURE — 99153 MOD SED SAME PHYS/QHP EA: CPT | Performed by: INTERNAL MEDICINE

## 2024-07-08 RX ORDER — GLUCAGON 1 MG/ML
1 KIT INJECTION ONCE
Status: COMPLETED | OUTPATIENT
Start: 2024-07-08 | End: 2024-07-08

## 2024-07-08 RX ORDER — ONDANSETRON 2 MG/ML
4 INJECTION INTRAMUSCULAR; INTRAVENOUS ONCE
Status: COMPLETED | OUTPATIENT
Start: 2024-07-08 | End: 2024-07-08

## 2024-07-08 RX ORDER — MORPHINE SULFATE 2 MG/ML
2 INJECTION, SOLUTION INTRAMUSCULAR; INTRAVENOUS ONCE
Status: COMPLETED | OUTPATIENT
Start: 2024-07-08 | End: 2024-07-08

## 2024-07-08 RX ORDER — FENTANYL CITRATE 50 UG/ML
INJECTION, SOLUTION INTRAMUSCULAR; INTRAVENOUS PRN
Status: DISCONTINUED | OUTPATIENT
Start: 2024-07-08 | End: 2024-07-09 | Stop reason: ALTCHOICE

## 2024-07-08 RX ORDER — KETOROLAC TROMETHAMINE 30 MG/ML
15 INJECTION, SOLUTION INTRAMUSCULAR; INTRAVENOUS ONCE
Status: COMPLETED | OUTPATIENT
Start: 2024-07-08 | End: 2024-07-08

## 2024-07-08 RX ORDER — 0.9 % SODIUM CHLORIDE 0.9 %
1000 INTRAVENOUS SOLUTION INTRAVENOUS ONCE
Status: COMPLETED | OUTPATIENT
Start: 2024-07-08 | End: 2024-07-09

## 2024-07-08 RX ORDER — MIDAZOLAM HYDROCHLORIDE 5 MG/ML
INJECTION INTRAMUSCULAR; INTRAVENOUS PRN
Status: DISCONTINUED | OUTPATIENT
Start: 2024-07-08 | End: 2024-07-09 | Stop reason: ALTCHOICE

## 2024-07-08 RX ADMIN — GLUCAGON 1 MG: KIT at 22:40

## 2024-07-08 RX ADMIN — SODIUM CHLORIDE 1000 ML: 9 INJECTION, SOLUTION INTRAVENOUS at 22:47

## 2024-07-08 RX ADMIN — KETOROLAC TROMETHAMINE 15 MG: 30 INJECTION, SOLUTION INTRAMUSCULAR at 22:40

## 2024-07-08 RX ADMIN — MORPHINE SULFATE 2 MG: 2 INJECTION, SOLUTION INTRAMUSCULAR; INTRAVENOUS at 22:41

## 2024-07-08 RX ADMIN — ONDANSETRON 4 MG: 2 INJECTION INTRAMUSCULAR; INTRAVENOUS at 22:40

## 2024-07-08 ASSESSMENT — PAIN DESCRIPTION - LOCATION: LOCATION: THROAT

## 2024-07-08 ASSESSMENT — PAIN DESCRIPTION - ORIENTATION: ORIENTATION: OTHER (COMMENT)

## 2024-07-08 ASSESSMENT — PAIN SCALES - GENERAL
PAINLEVEL_OUTOF10: 9
PAINLEVEL_OUTOF10: 9

## 2024-07-08 ASSESSMENT — PAIN DESCRIPTION - DESCRIPTORS: DESCRIPTORS: SORE

## 2024-07-08 ASSESSMENT — PAIN - FUNCTIONAL ASSESSMENT: PAIN_FUNCTIONAL_ASSESSMENT: 0-10

## 2024-07-09 VITALS
WEIGHT: 187 LBS | RESPIRATION RATE: 17 BRPM | HEIGHT: 60 IN | SYSTOLIC BLOOD PRESSURE: 100 MMHG | TEMPERATURE: 98.3 F | HEART RATE: 56 BPM | BODY MASS INDEX: 36.71 KG/M2 | DIASTOLIC BLOOD PRESSURE: 67 MMHG | OXYGEN SATURATION: 97 %

## 2024-07-09 PROCEDURE — 6360000002 HC RX W HCPCS: Performed by: INTERNAL MEDICINE

## 2024-07-09 NOTE — ED PROVIDER NOTES
THIS IS MY FARRUKH SUPERVISORY AND SHARED VISIT NOTE:    I personally saw the patient and made/approved the management plan and take responsibility for the patient management.    History: 69-year-old female present for evaluation of esophageal foreign body.  Patient states that she was eating chicken around 1230 today.  She states that she has not been able to tolerate foods or liquids since that time.  She has had some episodes of vomiting.  She denies any difficulty breathing at this time.  She has developed a mild headache.  She has not had prior history of esophageal food bolus.    Exam: Patient is not able to tolerate oral secretions.  No respiratory distress or increased work of breathing.  There is no focal neurological deficit.  Benign abdominal exam.    MDM: 69-year-old female present for evaluation of esophageal foreign body.    Patient currently is not able to tolerate oral secretions.  We will trial glucagon, Zofran to see if this helps improve her symptoms.  If not, patient will require endoscopy by GI for removal of esophageal food bolus.  I have contacted Dr. Barrera who is aware the patient if she does indeed require endoscopy.  Patient has been ordered Toradol and morphine for headache.    Patient will be signed out to incoming medical provider pending response to glucagon and GI evaluation for endoscopy.    I personally saw the patient and independently provided 0 minutes of non-concurrent critical care out of the total shared critical care time provided.         No results found.      I, Dr. Weir, am the primary clinician of record.     Comment: Please note this report has been produced using speech recognition software and may contain errors related to that system including errors in grammar, punctuation, and spelling, as well as words and phrases that may be inappropriate. If there are any questions or concerns please feel free to contact the dictating provider for clarification.     [place]

## 2024-07-09 NOTE — ED NOTES
Pt alert and oriented x 4, O2 97% on room air, VSS. IV removed w/o difficulty. Discharged to home with family members, verbalized understanding of discharge orders and follow up instructions. Pt stable upon discharge.

## 2024-07-09 NOTE — ED PROVIDER NOTES
[facility]  ED  EMERGENCY DEPARTMENT ENCOUNTER        Patient Name: Daniel Cueva  MRN: [id number]  Birthdate 1955  Date of evaluation: 7/8/2024  PCP: Houston Ross DO  Note Started: 9:56 PM EDT 7/8/24      CHIEF COMPLAINT  Swallowed Foreign Body (States eating chicken around 1230 today. Feels as though food caught in throat. Unable to eat or drink anything without it \"coming back up.\")         HISTORY & PHYSICAL     HISTORY OF PRESENT ILLNESS  History from : Patient    Limitations to history : None    Daniel Cueva is a 69 y.o. female  has a past medical history of Arthritis, Depression, Hyperlipidemia, and Thyroid disease. Patient presents to the ED complaining of a foreign object in throat. Patient was eating chicken at 1230 and started choking where she was not able to breath. The heimlich maneuver was done to dislodge object. Since then, patient endorses headache and vomiting without nausea. She has been intolerant to PO.       Old records reviewed: Yes    No other complaints, modifying factors or associated symptoms.  Nursing Notes were all reviewed and agreed with or any disagreements were addressed in the HPI.    I have reviewed the following from the nursing documentation.    Past Medical History:   Diagnosis Date    Arthritis     Depression     Hyperlipidemia     Thyroid disease      Past Surgical History:   Procedure Laterality Date    BREAST LUMPECTOMY      CHOLECYSTECTOMY      COLONOSCOPY  03/06/2017    polyps    HYSTERECTOMY (CERVIX STATUS UNKNOWN)       Family History   Problem Relation Age of Onset    Lung Cancer Mother     Diabetes Father     Other Sister         possible colon cancer, pt unsure     Social History     Socioeconomic History    Marital status:      Spouse name: Not on file    Number of children: Not on file    Years of education: Not on file    Highest education level: Not on file   Occupational History    Not on file   Tobacco Use

## 2024-07-09 NOTE — H&P
Pre-sedation Assessment    History and Physical / Pre-Sedation Assessment  Patient:  Daniel Cueva   :   1955     Intended Procedure: EGD      HPI: eso foreign body    Past Medical History:   Diagnosis Date    Arthritis     Depression     Hyperlipidemia     Thyroid disease      No current facility-administered medications on file prior to encounter.     Current Outpatient Medications on File Prior to Encounter   Medication Sig Dispense Refill    lisinopril (PRINIVIL;ZESTRIL) 5 MG tablet Take 1 tablet by mouth daily (Patient not taking: Reported on 2024) 90 tablet 1    Omega-3 Fatty Acids (FISH OIL) 1000 MG capsule Take by mouth daily (Patient not taking: Reported on 2024)      Multiple Vitamins-Minerals (ONE-A-DAY WOMENS VITACRAVES PO) Take by mouth (Patient not taking: Reported on 2024)      acetaminophen (TYLENOL) 500 MG tablet Take 1 tablet by mouth every 6 hours as needed for Pain (Patient not taking: Reported on 2024)      aspirin 81 MG tablet Take 1 tablet by mouth daily (Patient not taking: Reported on 2024)         Nurses notes reviewed and agreed.  Medications reviewed  Allergies: No Known Allergies        Physical Exam:  Vital Signs: BP (!) 188/92   Pulse 80   Temp 98.3 °F (36.8 °C) (Oral)   Resp 18   Ht 1.524 m (5')   Wt 84.8 kg (187 lb)   SpO2 95%   BMI 36.52 kg/m²  Body mass index is 36.52 kg/m².  Airway:Normal  Cardiac:Normal  Pulmonary:Normal  Abdomen:Normal  Specific to procedure: Mallampati 3      Pre-Procedure Assessment/Plan:  ASA 3 - Patient with moderate systemic disease with functional limitations    Level of Sedation Plan:Moderate sedation    Post Procedure plan: Return to same level of care    I assessed the patient and find that the patient is in satisfactory condition to proceed with the planned procedure and sedation plan.    I have explained the risk, benefits, and alternatives to the procedure. The patient understands and agrees to proceed.

## 2024-10-21 ENCOUNTER — HOSPITAL ENCOUNTER (OUTPATIENT)
Age: 69
Discharge: HOME | End: 2024-10-21
Payer: COMMERCIAL

## 2024-10-21 ENCOUNTER — OFFICE VISIT (OUTPATIENT)
Dept: PRIMARY CARE CLINIC | Age: 69
End: 2024-10-21
Payer: COMMERCIAL

## 2024-10-21 ENCOUNTER — TELEPHONE (OUTPATIENT)
Dept: PRIMARY CARE CLINIC | Age: 69
End: 2024-10-21

## 2024-10-21 VITALS
HEIGHT: 60 IN | SYSTOLIC BLOOD PRESSURE: 134 MMHG | TEMPERATURE: 97.9 F | DIASTOLIC BLOOD PRESSURE: 82 MMHG | HEART RATE: 65 BPM | WEIGHT: 192 LBS | BODY MASS INDEX: 37.69 KG/M2 | OXYGEN SATURATION: 98 % | RESPIRATION RATE: 14 BRPM

## 2024-10-21 DIAGNOSIS — I10 ESSENTIAL HYPERTENSION: ICD-10-CM

## 2024-10-21 DIAGNOSIS — E66.01 CLASS 2 SEVERE OBESITY WITH SERIOUS COMORBIDITY AND BODY MASS INDEX (BMI) OF 36.0 TO 36.9 IN ADULT, UNSPECIFIED OBESITY TYPE: ICD-10-CM

## 2024-10-21 DIAGNOSIS — E78.2 MIXED HYPERLIPIDEMIA: ICD-10-CM

## 2024-10-21 DIAGNOSIS — F41.9 ANXIOUSNESS: ICD-10-CM

## 2024-10-21 DIAGNOSIS — E66.812 CLASS 2 SEVERE OBESITY WITH SERIOUS COMORBIDITY AND BODY MASS INDEX (BMI) OF 36.0 TO 36.9 IN ADULT, UNSPECIFIED OBESITY TYPE: ICD-10-CM

## 2024-10-21 DIAGNOSIS — R73.03 PREDIABETES: ICD-10-CM

## 2024-10-21 DIAGNOSIS — H26.9 CATARACT OF BOTH EYES, UNSPECIFIED CATARACT TYPE: ICD-10-CM

## 2024-10-21 DIAGNOSIS — Z01.818 PRE-OP EXAMINATION: Primary | ICD-10-CM

## 2024-10-21 LAB
CHOLEST SERPL-MCNC: 261 MG/DL (ref 0–199)
HDLC SERPL-MCNC: 52 MG/DL (ref 40–60)
LDLC SERPL CALC-MCNC: 171 MG/DL
TRIGL SERPL-MCNC: 188 MG/DL (ref 0–150)
VLDLC SERPL CALC-MCNC: 38 MG/DL

## 2024-10-21 PROCEDURE — 1123F ACP DISCUSS/DSCN MKR DOCD: CPT

## 2024-10-21 PROCEDURE — 3075F SYST BP GE 130 - 139MM HG: CPT

## 2024-10-21 PROCEDURE — 80061 LIPID PANEL: CPT

## 2024-10-21 PROCEDURE — 36415 COLL VENOUS BLD VENIPUNCTURE: CPT

## 2024-10-21 PROCEDURE — 99214 OFFICE O/P EST MOD 30 MIN: CPT

## 2024-10-21 PROCEDURE — 3079F DIAST BP 80-89 MM HG: CPT

## 2024-10-21 PROCEDURE — 83036 HEMOGLOBIN GLYCOSYLATED A1C: CPT

## 2024-10-21 SDOH — ECONOMIC STABILITY: INCOME INSECURITY: HOW HARD IS IT FOR YOU TO PAY FOR THE VERY BASICS LIKE FOOD, HOUSING, MEDICAL CARE, AND HEATING?: NOT VERY HARD

## 2024-10-21 SDOH — ECONOMIC STABILITY: FOOD INSECURITY: WITHIN THE PAST 12 MONTHS, THE FOOD YOU BOUGHT JUST DIDN'T LAST AND YOU DIDN'T HAVE MONEY TO GET MORE.: NEVER TRUE

## 2024-10-21 SDOH — ECONOMIC STABILITY: FOOD INSECURITY: WITHIN THE PAST 12 MONTHS, YOU WORRIED THAT YOUR FOOD WOULD RUN OUT BEFORE YOU GOT MONEY TO BUY MORE.: NEVER TRUE

## 2024-10-21 ASSESSMENT — PATIENT HEALTH QUESTIONNAIRE - PHQ9
SUM OF ALL RESPONSES TO PHQ QUESTIONS 1-9: 5
4. FEELING TIRED OR HAVING LITTLE ENERGY: SEVERAL DAYS
8. MOVING OR SPEAKING SO SLOWLY THAT OTHER PEOPLE COULD HAVE NOTICED. OR THE OPPOSITE, BEING SO FIGETY OR RESTLESS THAT YOU HAVE BEEN MOVING AROUND A LOT MORE THAN USUAL: NOT AT ALL
2. FEELING DOWN, DEPRESSED OR HOPELESS: NOT AT ALL
SUM OF ALL RESPONSES TO PHQ QUESTIONS 1-9: 5
7. TROUBLE CONCENTRATING ON THINGS, SUCH AS READING THE NEWSPAPER OR WATCHING TELEVISION: MORE THAN HALF THE DAYS
1. LITTLE INTEREST OR PLEASURE IN DOING THINGS: MORE THAN HALF THE DAYS
6. FEELING BAD ABOUT YOURSELF - OR THAT YOU ARE A FAILURE OR HAVE LET YOURSELF OR YOUR FAMILY DOWN: NOT AT ALL
SUM OF ALL RESPONSES TO PHQ9 QUESTIONS 1 & 2: 2
SUM OF ALL RESPONSES TO PHQ QUESTIONS 1-9: 5
3. TROUBLE FALLING OR STAYING ASLEEP: NOT AT ALL
9. THOUGHTS THAT YOU WOULD BE BETTER OFF DEAD, OR OF HURTING YOURSELF: NOT AT ALL
10. IF YOU CHECKED OFF ANY PROBLEMS, HOW DIFFICULT HAVE THESE PROBLEMS MADE IT FOR YOU TO DO YOUR WORK, TAKE CARE OF THINGS AT HOME, OR GET ALONG WITH OTHER PEOPLE: SOMEWHAT DIFFICULT
5. POOR APPETITE OR OVEREATING: NOT AT ALL
SUM OF ALL RESPONSES TO PHQ QUESTIONS 1-9: 5

## 2024-10-21 NOTE — TELEPHONE ENCOUNTER
Patient is caling to let her doctor know that she is having local anesthesiology to have removed cataracts removed and she wants to let her doctor know that     Please advise

## 2024-10-21 NOTE — PROGRESS NOTES
Preoperative Consultation        [facility]                                  [address], [address], [place] [address]         Phone: [phone]      Daniel Cueva  YOB: 1955    Date of Service:  10/21/2024    Vitals:    10/21/24 0854   BP: 134/82   Pulse: 65   Resp: 14   Temp: 97.9 °F (36.6 °C)   TempSrc: Temporal   SpO2: 98%   Weight: 87.1 kg (192 lb)   Height: 1.524 m (5')      Wt Readings from Last 2 Encounters:   10/21/24 87.1 kg (192 lb)   07/08/24 84.8 kg (187 lb)     BP Readings from Last 3 Encounters:   10/21/24 134/82   07/09/24 100/67   05/15/23 118/62        Chief Complaint   Patient presents with    Pre-op Exam     Scheduled for bilateral cataract surgery with lens implant on 10/31/2024 for the left, right eye will be done later on 11/19/2024 at [facility] with Dr. Morales Fax is [phone]     No Known Allergies  No outpatient medications have been marked as taking for the 10/21/24 encounter (Office Visit) with Houston Ross DO.         This patient presents to the office today for a preoperative consultation at the request of surgeon, Dr. Mathew Morales, who plans on performing cataract removal and IOL placement on October 31 at [facility].  The current problem began unspecified time ago, and symptoms have been worsening with time.  Conservative therapy: No.    Planned anesthesia: Local   Known anesthesia problems: None   Bleeding risk: No recent or remote history of abnormal bleeding  Personal or FH of DVT/PE: No    Patient objection to receiving blood products: No      Other chronic conditions and meds:  Getting cataracts of both eyes removed on 10/31/2024. Will be under local anesthesia. Will also be getting intraocular lens of left eye.   No hx heart disease - no MI, stroke, arrythmia, CHF, JEANNE. No recent chest pain.   HTN - was previously on lisinopril 5 mg, quit taking it a year ago. She quit all

## 2024-10-22 LAB
EST. AVERAGE GLUCOSE BLD GHB EST-MCNC: 131.2 MG/DL
HBA1C MFR BLD: 6.2 %

## 2024-10-22 NOTE — RESULT ENCOUNTER NOTE
Pt phone went to message that the person is not available try call again later. No way to leave a voicemail.

## (undated) DEVICE — CONMED SCOPE SAVER BITE BLOCK, 20X27 MM: Brand: SCOPE SAVER

## (undated) DEVICE — RESCUE COMBO FORCEPS

## (undated) DEVICE — RETRIEVAL DEVICE: Brand: RESCUENET™

## (undated) DEVICE — ENDO CARRY-ON PROCEDURE KIT INCLUDES SUCTION TUBING, LUBRICANT, GAUZE, BIOHAZARD STICKER, TRANSPORT PAD AND INTERCEPT BEDSIDE KIT.: Brand: ENDO CARRY-ON PROCEDURE KIT